# Patient Record
Sex: MALE | Race: WHITE | NOT HISPANIC OR LATINO | Employment: PART TIME | URBAN - METROPOLITAN AREA
[De-identification: names, ages, dates, MRNs, and addresses within clinical notes are randomized per-mention and may not be internally consistent; named-entity substitution may affect disease eponyms.]

---

## 2017-10-20 ENCOUNTER — TELEPHONE (OUTPATIENT)
Dept: URGENT CARE | Facility: CLINIC | Age: 62
End: 2017-10-20

## 2017-10-20 NOTE — TELEPHONE ENCOUNTER
----- Message from Sergio Rosado MD sent at 10/19/2017  6:46 PM EDT -----  Urine culture was positive. Needs to finish the antibiotic plus needs to  a second antibiotic and take it for 5 days. If still having symptoms, needs to recheck with PMD.

## 2017-12-12 ENCOUNTER — TRANSCRIBE ORDERS (OUTPATIENT)
Dept: PHYSICAL THERAPY | Facility: HOSPITAL | Age: 62
End: 2017-12-12

## 2017-12-12 DIAGNOSIS — I63.9 STROKE OF UNKNOWN CAUSE (HCC): Primary | ICD-10-CM

## 2017-12-12 DIAGNOSIS — R53.1 WEAKNESS: ICD-10-CM

## 2017-12-13 ENCOUNTER — HOSPITAL ENCOUNTER (OUTPATIENT)
Dept: OCCUPATIONAL THERAPY | Facility: HOSPITAL | Age: 62
Setting detail: THERAPIES SERIES
Discharge: HOME | End: 2017-12-13

## 2017-12-13 DIAGNOSIS — Z78.9 IMPAIRED INSTRUMENTAL ACTIVITIES OF DAILY LIVING (IADL): ICD-10-CM

## 2017-12-13 DIAGNOSIS — M62.81 MUSCLE WEAKNESS: ICD-10-CM

## 2017-12-13 DIAGNOSIS — Z78.9 IMPAIRED MOBILITY AND ADLS: Primary | ICD-10-CM

## 2017-12-13 DIAGNOSIS — Z74.09 IMPAIRED MOBILITY AND ADLS: Primary | ICD-10-CM

## 2017-12-13 PROCEDURE — G8988 SELF CARE GOAL STATUS: HCPCS

## 2017-12-13 PROCEDURE — G8987 SELF CARE CURRENT STATUS: HCPCS

## 2017-12-13 PROCEDURE — 97166 OT EVAL MOD COMPLEX 45 MIN: CPT

## 2017-12-13 NOTE — THERAPY EVALUATION
Outpatient Occupational Therapy Rehab Program Initial Evaluation  [facility]     Patient Name: Bakari Dover  : 1955  MRN: [id number]  Today's Date: 2017      Visit Date: 2017    There is no problem list on file for this patient.  Visit Number: /   Improvement: N/A  Recert Date: 1/10/18  MD visit: 17         Past Medical History:   Diagnosis Date   • Diabetes mellitus    • Hypertension    • Kidney stone    • Renal disorder         Past Surgical History:   Procedure Laterality Date   • BACK SURGERY     • BRAIN SURGERY           Visit Dx:    ICD-10-CM ICD-9-CM   1. Impaired mobility and ADLs Z74.09 799.89   2. Impaired instrumental activities of daily living (IADL) R53.81 799.3   3. Muscle weakness M62.81 728.87             Patient History       17 0800          History    Chief Complaint Balance Problems;Difficulty with daily activities;Falls/history of falls;Fatigue/poor endurance;Impaired sensation;Muscle tenderness;Muscle weakness;Numbness;Pain;Tingling;Difficulty Walking  -MR      Type of Pain Back pain  -MR      Date Current Problem(s) Began 06/15/17  -MR      Brief Description of Current Complaint 06/15/17 pt admitted to hospital with compressed vertebra, he had surgery on vertebra resulting in subdural hematoma causing right side weakness, weakness has since improved. Pt engaged in OT at [facility] for ~ one month (October), pt moved into a [place] apartment, he lives alone, and within the past week has been released to drive. Pt had  therapy at home and was d/c from  on 17. Pt does have carpal tunnel syndrome and neuropathy in his hands both diagnosed last week. Pt has MD appoint,ent this date and educated to keep us updated. Pt has old left shoulder rotator cuff injury, resulting in mild to moderate pain during increased activity. Pt reported he had a recent fall on 17 when he tried to use his straight cane for community mobility.    -MR      Onset Date-  OT 12/13/17  -MR      Patient/Caregiver Goals Return to prior level of function;Improve strength;Improve mobility;Other (comment);Know what to do to help the symptoms   improve hand function, balance, increase endurance  -MR      Current Tobacco Use none  -MR      Smoking Status none  -MR      Patient's Rating of General Health Good  -MR      Hand Dominance right-handed  -MR      Occupation/sports/leisure activities enjoys reading, word with friends, design work, - pt not at PLOF with preferred leisure activities.   -MR      Patient seeing anyone else for problem(s)? No  -MR      Pain     Pain Location Back  -MR      Pain at Present 2  -MR      Pain at Best 0  -MR      Pain at Worst 9  -MR      Pain Frequency Constant/continuous  -MR      Pain Description Sharp  -MR      What Performance Factors Make the Current Problem(s) WORSE? when first getting up and when first standing in the morning  -MR      What Performance Factors Make the Current Problem(s) BETTER? rest, sleep, laying down  -MR      Pain Comments does not take medication for pain  -MR      Is your sleep disturbed? No  -MR      Is medication used to assist with sleep? Yes   restless leg meds  -MR      Total hours of sleep per night 7 - 8  -MR      What position do you sleep in? Right sidelying;Left sidelying  -MR      Difficulties at work? 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 Currently not working. Pt also previously assisted with an .   -MR      Difficulties with ADL's? Pt faitgues quickly during/after bathing and dressing even with the use of AD. Pt fatigues quickly when preparing light meals he implements energy conservation techniques by sitting on his rollator. Pt performs light house keeping with increased time to complete task and increased fatigue with rest breaks, pt performs sweeping, spot mopping, and laundry with mild difficulty d/t decreased endurance. Pt did not identify any difficulty  cutting food up or self-feeding.   -MR      Difficulties with recreational activities? Pt identifies that he enjoys reading, playing games on electronics, pt stated he would like to regain function in his hands to be able to fix things around the house without having to ask for assistance.   -MR      Fall Risk Assessment    Any falls in the past year: Yes  -MR      Number of falls reported in the last 12 months 3  -MR      Factors that contributed to the fall: Tripped;Uneven surface;Lost balance  -MR      Does patient have a fear of falling No  -MR      Services    Prior Rehab/Home Health Experiences Yes  -MR      When was the prior experience with Rehab/Home Health HH  -MR      Are you currently receiving Home Health services No  -MR      Daily Activities    Primary Language English  -MR      How does patient learn best? Demonstration;Pictures/Video  -MR      Teaching needs identified Home Exercise Program;Management of Condition;Falls Prevention;Home Safety  -MR      Patient is concerned about/has problems with Coordination;Difficulty with self care (i.e. bathing, dressing, toileting:;Flexibility;Grasping objects lifting;Performing home management (household chores, shopping, care of dependents);Performing job responsibilities/community activities (work, school,;Performing sports, recreation, and play activities;Repetitive movements of the hand, arm, shoulder;Standing;Writing/grasping items with hand(s);Transfers (getting out of a chair, bed)  -MR      Barriers to learning None  -MR      Pt Participated in POC and Goals Yes  -MR      Safety    Are you being hurt, hit, or frightened by anyone at home or in your life? No  -MR      Are you being neglected by a caregiver No  -MR        User Key  (r) = Recorded By, (t) = Taken By, (c) = Cosigned By    Initials Name Provider Type     Michelle JOSÉ Santana, OT Occupational Therapist                  OT Neuro       12/13/17 0800          Subjective Comments    Subjective  Comments Pt reported he has an MD appointment this pm. Pt reports he was d/c from  on 12/4/17 and has recently been released to drive. Pt expresses his major concerns are improving his balance, strength, endurance, and hand function.  -MR      Precautions and Contraindications    Precautions/Limitations fall precautions  -MR      Subjective Pain    Able to rate subjective pain? yes  -MR      Pre-Treatment Pain Level 2  -MR      Post-Treatment Pain Level 3  -MR      Subjective Pain Comment Pt explained he has lower back pain he associates with sciatic nerve pain, expresses pain is worste when first getting up in the morning and when standing after sitting for lengthly periods  -MR      Pain Assessment    Pain Assessment --  -MR      Home Living    Living Arrangements apartment   ADA  -MR      Home Accessibility bed and bath on same level;grab bars present (bathtub);grab bars present (toilet)  -MR      Stair Railings at Home none  -MR      Home Equipment Rollator;Cane;Reacher;Sock aid;Grab bars;Rolling walker   shower bench, hand held shower head, toilet tongs  -MR      Living Environment Comment Pt lives in alone in an [place] apartment and pt implements various AD for completing ADL's  -MR      Vision- Basic    Current Vision Wears glasses only for reading  -MR      Cognitive Assessment/Intervention    Current Cognitive/Communication Assessment functional  -MR      Orientation Status oriented x 4  -MR      Follows Commands/Answers Questions 100% of the time;able to follow multi-step instructions;able to follow single-step instructions  -MR      Personal Safety mild impairment  -MR      Personal Safety Interventions nonskid shoes/slippers when out of bed;supervised activity  -MR      Cognition Comments Pt completed the SLUMS examination this date. Pt scored 22/30. Pt demstrated difficulty recaling 5 objects recalled 4/5, pt recalled 9 animals in 1 minute, and pt had difficulty following directsion regarding placing  hour markers on the clock face, pt recalled 3/4 answers appropriately from the short passage.   -MR      Sensation    Sensation WNL? WNL  -MR      Light Touch No apparent deficits  -MR      Proprioception    Proprioception WFL  -MR      Perception    Perception WNL? WNL  -MR      Coordination    Coordination WNL? WNL  -MR      Coordination Tests Finger to nose eyes open;9-Hole Peg  -MR      Finger to Nose Eyes Open Bilteral:;Intact  -MR      9-Hole Peg Left 34.22 s; no drops  -MR      9-Hole Peg Right 33.56 s; 1 drop  -MR      ROM (Range of Motion)    General ROM no range of motion deficits identified  -MR      General ROM Detail BUE WFL- some difficulties noted in shoulder/BUE range trying to touch the anterior part of head.   -MR      MMT (Manual Muscle Testing)    General MMT Assessment upper extremity strength deficits identified  -MR      General MMT Assessment Detail BUE grossly 4/5  -MR      Transfers    Transfers, Sit-Stand [place] conditional independence  -MR      Transfers, Stand-Sit [place] conditional independence  -MR      Transfers, Sit-Stand-Sit, Assist Device --   rollator  -MR      Functional Mobility    Functional Mobility- Ind. Level conditional independence;supervision required  -MR      Functional Mobility- Device rollator  -MR      Functional Mobility-Distance (Feet) --   from lobby <> OT office  -MR      Functional Mobility- Safety Issues step length decreased;other (see comments)  -MR      Functional Mobility- Comment Pt did not display safety with the rollator, brakes where not locked when he completed transfers. Took pt increased time to transition from sitting to standing  -MR      Balance Skills Training    Sitting-Level of Assistance Independent  -MR      Sitting-Balance Support Right upper extremity supported;Left upper extremity supported;Feet supported   back supported  -MR      Standing-Level of Assistance Distant supervision;Independent  -MR      Static Standing Balance  Support Right upper extremity supported;Left upper extremity supported;assistive device   rollator  -MR      Gait Balance-Level of Assistance Distant supervision;Independent  -MR      Gait Balance Support Right upper extremity supported;Left upper extremity supported;assistive device   rollator  -MR        User Key  (r) = Recorded By, (t) = Taken By, (c) = Cosigned By    Initials Name Provider Type    MR Michelle Santana, OT Occupational Therapist           Hand Therapy (last 24 hours)      Hand Eval       12/13/17 0800          Subjective Comments    Subjective Comments Please see OT neuro section  -MR      Hand  Strength     Strength Affected Side Bilateral  -MR       Strength Right    Right  Test 1 60  -MR      Right  Test 2 61  -MR      Right  Test 3 55  -MR       Strength Average Right 58.67  -MR       Strength Left    Left  Test 1 67  -MR      Left  Test 2 56  -MR      Left  Test 3 69  -MR       Strength Average Left 64  -MR      Pinch Strength    Affected Side Bilateral  -MR      Right Hand Strength - Pinch (lbs)    Lateral 17 lbs  -MR      Tip (2 point) 8 lbs  -MR      Three Jaw Atul 14 lbs  -MR      Left Hand Strength - Pinch (lbs)    Lateral 18 lbs  -MR      Tip (2 point) 11 lbs  -MR      Three Jaw Atul 14 lbs  -MR      Therapy Education    Education Details Pt educated about role of OT and POC. Pt educated on updated OT regarding MD visits or any changes with carpal tunnel syndrome. Pt educated on the importance of carry over from OP to home; including safety recommendations and HEP. Pt encouraged to continue with HEP; theraband exercises and BUE AROM exercises. Pt educated on the benefit of activity.   -MR      Given HEP;Symptoms/condition management   OT POC  -MR      Program New  -MR      How Provided Verbal  -MR      Provided to Patient  -MR      Level of Understanding Verbalized  -MR        User Key  (r) = Recorded By, (t) = Taken By, (c) = Cosigned  By    Initials Name Provider Type     Michelle KUMAR Max, OT Occupational Therapist                Therapy Education  Education Details: Pt educated about role of OT and POC. Pt educated on updated OT regarding MD visits or any changes with carpal tunnel syndrome. Pt educated on the importance of carry over from OP to home; including safety recommendations and HEP. Pt encouraged to continue with HEP; theraband exercises and BUE AROM exercises. Pt educated on the benefit of activity.   Given: HEP, Symptoms/condition management (OT POC)  Program: New  How Provided: Verbal  Provided to: Patient  Level of Understanding: Verbalized          OT Goals       12/13/17 1047 12/13/17 0800    OT Short Term Goals    STG Date to Achieve  --   by 4 weeks  -MR    STG 1  Pt will complete FM activities with 75% accuracy on at least 2/3 sessions.   -MR    STG 1 Progress  New  -MR    STG 2  Pt will complete BUE AROM exercises in all planes to increase strength to 5/5 to increase functional independence with ADL's and IADL's.  -MR    STG 2 Progress  New  -MR    STG 3  Pt will tolerate 5-7 mins of functional standing activity with no lose of balance or increased pain on 2/3 sessions.   -MR    STG 3 Progress  New  -MR    STG 4  Pt will complete BUE coordination task with 75% accuracy on at least 2/3 sessions.   -MR    STG 4 Progress  New  -MR    Long Term Goals    LTG Date to Achieve  --   by d/c  -MR    LTG 1  Pt will be independent with progressing HEP on at least 3/3 sessions.  -MR    LTG 1 Progress  New  -MR    LTG 2  Pt will be able to simulate and/or report conditional independence with efficient time frame with all ADL's on at least 2/3 sessions.   -MR    LTG 2 Progress  New  -MR    LTG 3  Pt will be able to simulate and/or report conditional independence with efficient time frame with IADL skills including light house keeping tasks, meal preparation, and general home management skills on at least 2/3 sessions.  -MR    LTG 3  Progress  New  -MR    LTG 4  Pt will tolerate 45 minutes treatment session with no fatigue/rest breaks required on at least 2/3 session.   -MR    LTG 4 Progress  New  -MR    Time Calculation    OT Goal Re-Cert Due Date 01/10/18  -MR 01/10/18  -MR      User Key  (r) = Recorded By, (t) = Taken By, (c) = Cosigned By    Initials Name Provider Type    MR Michelle Santana, OT Occupational Therapist                OT Assessment/Plan       12/13/17 0800       OT Assessment    Functional Limitations Decreased safety during functional activities;Impaired locomotion;Limitation in home management;Limitations in community activities;Limitations in functional capacity and performance;Performance in leisure activities;Performance in self-care ADL;Performance in work activities  -MR     Impairments Balance;Cognition;Coordination;Dexterity;Endurance;Impaired flexibility;Impaired muscle endurance;Impaired muscle length;Impaired muscle power;Impaired postural alignment;Impaired sensory integrity;Joint integrity;Joint mobility;Locomotion;Motor function;Muscle strength;Pain;Poor body mechanics;Posture;Sensation;Impaired aerobic capacity;Impaired neuromotor development  -MR     Assessment Comments OT eval completed this date. Pt completed sit <> stand t/f with rollator with sup/cond I, pt demonstrated safety concerns with t/f's and functional ambulation. Pt completed quick DASH, 9-hole peg test, SLUMS, ROM/MMT/Sensation testing this date. Pt identified balance and hand function where two areas of major concern, pt also identified that his endurance and activity tolerance are decreased resulting in difficulty completing ADL's and [facility]ADL's at [facility]. Pt would benefit from skilled OT services d/t decreased strength, decreased endurance, decreased balance, decreased activity tolerance, decreased independence with ADL's, decreased FM/GM/ bilateral integration and coordination decreased functional independence with IADL's.  -MR     OT Diagnosis  impaired mobility, ADL's and IADL's  -MR     OT Rehab Potential Good  -MR     Patient/caregiver participated in establishment of treatment plan and goals Yes  -MR     Patient would benefit from skilled therapy intervention Yes  -MR     OT Plan    OT Frequency 2x/week  -MR     Predicted Duration of Therapy Intervention (days/wks) Pt will be re-assessed in 30 days  -MR     Planned CPT's? OT EVAL MOD COMPLEXITY: 22823;OT THER ACT EA 15 MIN: [id number];OT THER PROC EA 15 MIN: 11884UI;OT SELF CARE/MGMT/TRAIN 15 MIN: 36362;OT HOT/COLD PACK;OT CARE PLAN EA 15 MIN;OT THER SUPP EA 15 MIN:;OT RE-EVAL: 67733;OT NEUROMUSC RE EDUCATION EA 15 MIN: 68164;OT PARAFFIN BATH: 39060UO;OT MANUAL THERAPY EA 15 MIN: 49613;OT SENS INTEGRATIVE TECH EACH 15 MIN: 43260   superficial modalities and k-tape as needed  -MR     Planned Therapy Interventions (Optional Details) balance training;home exercise program;joint mobilization;manual therapy techniques;motor coordination training;patient/family education;postural re-education;ROM (Range of Motion);stretching;strengthening;transfer training;neuromuscular re-education   superficial modalities  -MR     OT Plan Comments Recommend skilled OT services to help pt reach maximum level of independence with ADL's and IADL's.   -MR       User Key  (r) = Recorded By, (t) = Taken By, (c) = Cosigned By    Initials Name Provider Type     Michelle Santana, OT Occupational Therapist                  Outcome Measure Options: Quick DASH  9 Hole Peg  9-Hole Peg Left: 34.22 s; no drops  9-Hole Peg Right: 33.56 s; 1 drop  Quick DASH  Open a tight or new jar.: Mild Difficulty  Do heavy household chores (e.g., wash walls, wash floors): Moderate Difficulty  Carry a shopping bag or briefcase: Mild Difficulty  Wash your back: Severe Difficulty  Use a knife to cut food: Moderate Difficulty  Recreational activities in which you take some force or impact through your arm, should or hand (e.g. golf, hammering, tennis,  etc.): Severe Difficulty  During the past week, to what extent has your arm, shoulder, or hand problem interfered with your normal social activites with family, friends, neighbors or groups?: Moderately  During the past week, were you limited in your work or other regular daily activities as a result of your arm, shoulder or hand problem?: Moderately Limited  Arm, Shoulder, or hand pain: Moderate  Tingling (pins and needles) in your arm, shoulder, or hand: Severe  During the past week, how much difficulty have you had sleeping because of the pain in your arm, shoulder or hand?: Mild Difficulty  Number of Questions Answered: 11  Quick DASH Score: 50         Time Calculation:   OT Start Time: 0800  OT Stop Time: 0905  OT Time Calculation (min): 65 min     Therapy Charges for Today     Code Description Service Date Service Provider Modifiers Qty    [id number]  OT SELFCARE CURRENT 12/13/2017 SHAHIDA Fontenot, CK 1    [id number]  OT SELFCARE PROJECTED 12/13/2017 SHAHIDA Fontenot, CI 1    [id number]  OT EVAL MOD COMPLEXITY 4 12/13/2017 Michelle Santana OT GO 1          OT G-codes  OT Professional Judgement Used?: Yes  OT Functional Scales Options: 9 Hole Peg, Quick DASH  Score: 50  Functional Limitation: Self care  Self Care Current Status (): At least 40 percent but less than 60 percent impaired, limited or restricted  Self Care Goal Status (): At least 1 percent but less than 20 percent impaired, limited or restricted       Michelle Santana OT  12/13/2017

## 2017-12-18 ENCOUNTER — HOSPITAL ENCOUNTER (OUTPATIENT)
Dept: OCCUPATIONAL THERAPY | Facility: HOSPITAL | Age: 62
Setting detail: THERAPIES SERIES
Discharge: HOME | End: 2017-12-18

## 2017-12-18 DIAGNOSIS — Z78.9 IMPAIRED INSTRUMENTAL ACTIVITIES OF DAILY LIVING (IADL): ICD-10-CM

## 2017-12-18 DIAGNOSIS — Z78.9 IMPAIRED MOBILITY AND ADLS: Primary | ICD-10-CM

## 2017-12-18 DIAGNOSIS — M62.81 MUSCLE WEAKNESS: ICD-10-CM

## 2017-12-18 DIAGNOSIS — Z74.09 IMPAIRED MOBILITY AND ADLS: Primary | ICD-10-CM

## 2017-12-18 PROCEDURE — 97530 THERAPEUTIC ACTIVITIES: CPT

## 2017-12-18 PROCEDURE — 97110 THERAPEUTIC EXERCISES: CPT

## 2017-12-18 NOTE — THERAPY TREATMENT NOTE
Outpatient Occupational Therapy Rehab Program Treatment  [facility]     Patient Name: Bakari Dover  : 1955  MRN: [id number]  Today's Date: 2017        Visit Date: 2017    There is no problem list on file for this patient.  Visit Number: 2/2  % Improvement: N/A  Recert Date: 01/10/18  MD visit: N/A    Insurance Visits Approved: 22 total, 20 remaining       Past Medical History:   Diagnosis Date   • Diabetes mellitus    • Hypertension    • Kidney stone    • Renal disorder         Past Surgical History:   Procedure Laterality Date   • BACK SURGERY     • BRAIN SURGERY           Visit Dx:    ICD-10-CM ICD-9-CM   1. Impaired mobility and ADLs Z74.09 799.89   2. Impaired instrumental activities of daily living (IADL) R53.81 799.3   3. Muscle weakness M62.81 728.87               OT Neuro       17 0930          Subjective Comments    Subjective Comments Pt reported he was able to engage in community activities over the weekend with no lose of balance or safety concerns noted. Pt reported he has been experiencing sciatic nerve pain following increased ambulation and activity.   -MR      Precautions and Contraindications    Precautions/Limitations fall precautions  -MR      Cognitive Assessment/Intervention    Current Cognitive/Communication Assessment functional  -MR      Orientation Status oriented x 4  -MR      Follows Commands/Answers Questions 100% of the time;able to follow multi-step instructions  -MR      Personal Safety mild impairment   rollator safety concerns  -MR      Personal Safety Interventions fall prevention program maintained;gait belt;nonskid shoes/slippers when out of bed;supervised activity  -MR      Coordination    Coordination WNL? WNL  -MR      Gross Motor Training    Gross Motor Skill, Impairments Detail Pt completed functional cooking task this date consisting of: using a manual can opener, measuring, pouring, mixing ingredients while standing unsupport working at  a table top level. Pt stood for ~ 9 minutes with one seated recovery. Pt demonstrated 50-60% FM accuracy during in-hand manipulation task. Pt engaged BUE bilateral integration activity. Pt completed BUE exercises on biodex while standing with rollator behind him, he completed 2 x 15 reps of shoulder flexion/extension and chest pull with 40# wt. Pt required one seated recovery between sets.     -MR      Transfers    Transfers, Sit-Stand [place] conditional independence  -MR      Transfers, Stand-Sit [place] conditional independence  -MR      Functional Mobility    Functional Mobility- Ind. Level conditional independence  -MR      Functional Mobility- Device rollator  -MR      Functional Mobility- Comment Pt displayed safety concerns with rollator use.   -MR      Balance Skills Training    Sitting-Level of Assistance Independent  -MR      Sitting-Balance Support Right upper extremity supported;Left upper extremity supported;Feet supported  -MR      Standing-Level of Assistance Distant supervision;Independent  -MR      Static Standing Balance Support Right upper extremity supported;Left upper extremity supported;assistive device  -MR      Gait Balance-Level of Assistance Distant supervision;Independent  -MR      Gait Balance Support Right upper extremity supported;Left upper extremity supported;assistive device  -MR        User Key  (r) = Recorded By, (t) = Taken By, (c) = Cosigned By    Initials Name Provider Type    MR Michelle Santana, OT Occupational Therapist           Hand Therapy (last 24 hours)      Hand Eval       12/18/17 9657          Subjective Comments    Subjective Comments Please see OT neuro session  -MR      Subjective Pain    Able to rate subjective pain? yes  -MR      Pre-Treatment Pain Level 0  -MR      Post-Treatment Pain Level 0  -MR      Pain Assessment    Pain Assessment No/denies pain  -MR      Therapy Education    Education Details Pt educated on safety with rollator use (locking  brakes when setting and proprer placement during functional standing balance tasks. Pt educated on HEP consisting of stretches. Pt educated on the continued benefit of activity and increasing his partcipation in IADL's.    -MR      Given HEP  -MR      Program Progressed  -MR      How Provided Verbal;Demonstration  -MR      Provided to Patient  -MR      Level of Understanding Verbalized  -MR        User Key  (r) = Recorded By, (t) = Taken By, (c) = Cosigned By    Initials Name Provider Type    MR Michelle Santana, OT Occupational Therapist                Therapy Education  Education Details: Pt educated on safety with rollator use (locking brakes when setting and proprer placement during functional standing balance tasks. Pt educated on HEP consisting of stretches. Pt educated on the continued benefit of activity and increasing his partcipation in IADL's.    Given: HEP  Program: Progressed  How Provided: Verbal, Demonstration  Provided to: Patient  Level of Understanding: Verbalized          OT Assessment/Plan       12/18/17 0930       OT Assessment    Assessment Comments Pt did not met any new goals this date. Pt participated in functional cooking task requiring increased endurance and activity tolerance, GMC and FMC skills. Pt tolerated ~ 9 minutes of standing during meal prep activity. Pt required cues to take rest breaks appropriately. Pt demonstrated decreased safety awareness for rollator use. Pt completed BUE AROM exercises in shoulder flexion/extension and elbow flexion/extension. Pt would continue to benefit from skilled OT services d/t decreased strength, decreased endurance, decreased activity tolerance, decreased GMC/FMC/B coordination and B integration. Recommend cont POC.  -MR     OT Plan    OT Frequency 2x/week  -MR       User Key  (r) = Recorded By, (t) = Taken By, (c) = Cosigned By    Initials Name Provider Type    MR Michelle Santana, OT Occupational Therapist                 OT Goals        12/18/17 0930       OT Short Term Goals    STG Date to Achieve --   by 4 weeks  -MR     STG 1 Pt will complete FM activities with 75% accuracy on at least 2/3 sessions.   -MR     STG 1 Progress Progressing  -MR     STG 2 Pt will complete BUE AROM exercises in all planes to increase strength to 5/5 to increase functional independence with ADL's and IADL's.  -MR     STG 2 Progress Progressing  -MR     STG 3 Pt will tolerate 5-7 mins of functional standing activity with no lose of balance or increased pain on 2/3 sessions.   -MR     STG 3 Progress Progressing  -MR     STG 4 Pt will complete BUE coordination task with 75% accuracy on at least 2/3 sessions.   -MR     STG 4 Progress Progressing  -MR     Long Term Goals    LTG Date to Achieve --   by d/c  -MR     LTG 1 Pt will be independent with progressing HEP on at least 3/3 sessions.  -MR     LTG 1 Progress Progressing  -MR     LTG 2 Pt will be able to simulate and/or report conditional independence with efficient time frame with all ADL's on at least 2/3 sessions.   -MR     LTG 2 Progress Ongoing  -MR     LTG 3 Pt will be able to simulate and/or report conditional independence with efficient time frame with IADL skills including light house keeping tasks, meal preparation, and general home management skills on at least 2/3 sessions.  -MR     LTG 3 Progress Progressing  -MR     LTG 4 Pt will tolerate 45 minutes treatment session with no fatigue/rest breaks required on at least 2/3 session.   -MR     LTG 4 Progress Progressing  -MR     Time Calculation    OT Goal Re-Cert Due Date 01/10/18  -MR       User Key  (r) = Recorded By, (t) = Taken By, (c) = Cosigned By    Initials Name Provider Type    MR Michelle Santana, OT Occupational Therapist                              Time Calculation:   OT Start Time: 0930  OT Stop Time: 1015  OT Time Calculation (min): 45 min  Total Timed Code Minutes- OT: 45 minute(s)     Therapy Charges for Today     Code Description Service Date  Service Provider Modifiers Qty    [id number]  OT THERAPEUTIC ACT EA 15 MIN 12/18/2017 Michelle Santana OT GO 2    [id number]  OT THER PROC EA 15 MIN 12/18/2017 Michelle Santana OT GO 1                    Michelle Santana OT  12/18/2017

## 2017-12-20 ENCOUNTER — HOSPITAL ENCOUNTER (OUTPATIENT)
Dept: OCCUPATIONAL THERAPY | Facility: HOSPITAL | Age: 62
Setting detail: THERAPIES SERIES
Discharge: HOME | End: 2017-12-20

## 2017-12-20 DIAGNOSIS — Z78.9 IMPAIRED INSTRUMENTAL ACTIVITIES OF DAILY LIVING (IADL): ICD-10-CM

## 2017-12-20 DIAGNOSIS — M62.81 MUSCLE WEAKNESS: ICD-10-CM

## 2017-12-20 DIAGNOSIS — Z74.09 IMPAIRED MOBILITY AND ADLS: Primary | ICD-10-CM

## 2017-12-20 DIAGNOSIS — Z78.9 IMPAIRED MOBILITY AND ADLS: Primary | ICD-10-CM

## 2017-12-20 PROCEDURE — 97530 THERAPEUTIC ACTIVITIES: CPT

## 2017-12-20 PROCEDURE — 97110 THERAPEUTIC EXERCISES: CPT

## 2017-12-20 NOTE — THERAPY TREATMENT NOTE
Outpatient Occupational Therapy Rehab Program Treatment  [facility]     Patient Name: Bakari Dover  : 1955  MRN: [id number]  Today's Date: 2017        Visit Date: 2017    There is no problem list on file for this patient.  Visit Number: 3/3  % Improvement: N/A  Recert Date: 01/10/18  MD visit: next month    Insurance Visits Approved: 22 total 19 remaining       Past Medical History:   Diagnosis Date   • Diabetes mellitus    • Hypertension    • Kidney stone    • Renal disorder         Past Surgical History:   Procedure Laterality Date   • BACK SURGERY     • BRAIN SURGERY           Visit Dx:    ICD-10-CM ICD-9-CM   1. Impaired mobility and ADLs Z74.09 799.89   2. Impaired instrumental activities of daily living (IADL) R53.81 799.3   3. Muscle weakness M62.81 728.87               OT Neuro       17 0944          Subjective Comments    Subjective Comments Pt reported he felt increased stiffness this date. Pt reported he does not have an MD appointment for another month.   -MR      Precautions and Contraindications    Precautions/Limitations fall precautions  -MR      Subjective Pain    Able to rate subjective pain? yes  -MR      Pre-Treatment Pain Level 0  -MR      Post-Treatment Pain Level 0  -MR      Subjective Pain Comment Pt did not report any pain this date. Pt reported he had increased stiffness this date.   -MR      Pain Assessment    Pain Assessment No/denies pain  -MR      Cognitive Assessment/Intervention    Current Cognitive/Communication Assessment functional  -MR      Orientation Status oriented x 4  -MR      Follows Commands/Answers Questions 100% of the time;able to follow single-step instructions;needs cueing  -MR      Personal Safety mild impairment   safety with rollator use  -MR      Personal Safety Interventions fall prevention program maintained;gait belt;nonskid shoes/slippers when out of bed;supervised activity  -MR      Posture/Observations    Posture- WNL  "Posture is WNL  -MR      Coordination    Other Coordination Observations During B integration/coordination activities he displayed mild - moderate difficulty, displaying a delayed response.   -MR      Coordination Tests Rapid Alternating;Bilateral integration  -MR      Rapid Alternating Right:;Left:;Impaired  -MR      Bilateral Integration Right:;Left:;Impaired   mild  -MR      Gross Motor Training    Gross Motor Skill, Impairments Detail Pt completed BUE exercises on [employer] machine, 2 x 15 reps with 20#, pt completed L/R one arm shoulder ab/ad push and pull exercise while standing with min VC for technique and safety. Pt demonstrated fair - balance when initating exercises. Pt completed 2 x 15 ball tosses on rebounder while setaed on rollator with 2# weighted ball to focus on B integration and coordination skills; pt had 3 drops during task. Attempted to completed rebounder activity in standing pt became unsteady, activity was discontinued. Pt completed functional standing balance activities while standing with no AD, pt stood for 4 x 2 minutes each to completed alternating dribbling task x 2 and chest passes x 2. Chest passed challenged pts balance by causing pt to reach outside of MARC. Pt required multiple seated recovery periods this date during BUE exercises and between each standing balance activity. Pt educated on HEP stretches and simiulated stretches while laying in mat table; pt completed shoulder flexion/extension and chest presses while laying supine with none weighted straight cane 1 x 10 reps. Pt required vc for technique and appropriate speed of exercises \" not to rush, slow and controlled.\"   -MR      ROM (Range of Motion)    General ROM Detail Pt educated on completing ROM HEP stretches with non-weight dowel each morning, pt simulated/demonstrated stretches while laying supine on mat table - shoulder flexion/extension and chest presses.   -MR      Bed Mobility    Bed Mobility, Scoot/Bridge, " [place] conditional independence  -MR      Bed Mob, Supine to Sit, [place] conditional independence  -MR      Bed Mob, Sit to Supine, [place] conditional independence  -MR      Bed Mobility, Comment Bed mobility required extra time  -MR      Transfers    Transfers, Sit-Stand [place] supervision required;stand by assist;verbal cues required  -MR      Transfers, Stand-Sit [place] supervision required;stand by assist;verbal cues required  -MR      Transfers, Sit-Stand-Sit, Assist Device --   rollator  -MR      Transfer, Comment Pt displayed poor safety with rollator use. Pt required mod VC to remember to lock brakes during t/f.   -MR      Functional Mobility    Functional Mobility- Ind. Level conditional independence;supervision required  -MR      Functional Mobility- Device rollator  -MR      Functional Mobility-Distance (Feet) --   throughout sports [employer] building  -MR      Functional Mobility- Safety Issues step length decreased;loses balance backward   rollator use safety  -MR      Functional Mobility- Comment Pt displayed poor safety with rollator use. Pt required mod VC to remember to lock brakes during t/f.   -MR      Balance Skills Training    Sitting-Level of Assistance Independent  -MR      Sitting-Balance Support Feet supported  -MR      Standing-Level of Assistance Close supervision;Distant supervision;Independent;Contact guard   depending upon difficulty of stabding balance activity  -MR      Static Standing Balance Support Right upper extremity supported;Left upper extremity supported;assistive device  -MR      Gait Balance-Level of Assistance Distant supervision;Close supervision  -MR      Gait Balance Support Right upper extremity supported;Left upper extremity supported;assistive device  -MR        User Key  (r) = Recorded By, (t) = Taken By, (c) = Cosigned By    Initials Name Provider Type    MR Michelle Santana, OT Occupational Therapist           Hand Therapy (last 24 hours)       Hand Eval       12/20/17 0944          Therapy Education    Education Details Pt educated on HEP; continuing current exercises and implementing new AROM stretches with dowel lashaun while laying supine. Pt educated on safety with t/f using rollator safely, remembering to lock the brakes.   -MR      Given HEP  -MR      Program Progressed  -MR      How Provided Verbal;Demonstration  -MR      Provided to Patient  -MR      Level of Understanding Demonstrated;Verbalized  -MR        User Key  (r) = Recorded By, (t) = Taken By, (c) = Cosigned By    Initials Name Provider Type    MR Michelle Santana, OT Occupational Therapist                Therapy Education  Education Details: Pt educated on HEP; continuing current exercises and implementing new AROM stretches with dowel l[place] while laying supine. Pt educated on safety with t/f using rollator safely, remembering to lock the brakes.   Given: HEP  Program: Progressed  How Provided: Verbal, Demonstration  Provided to: Patient  Level of Understanding: Demonstrated, Verbalized          OT Assessment/Plan       12/20/17 0944       OT Assessment    Assessment Comments Pt did not met any new goals this session. Pt completed functional standing balance with increased awareness of balance deficits. Pt completed B integration tasks with 60-70% accuracy while sitting. Pt completed BUE strengthing in all planes with min difficulty. Pt showed signs of increased fatigue and perspiration. Pt completed HEP AROM stretches with min VC cues and encouraged to complete AROM stretches each morning. Pt would continue to beneift from skilled OT services.   -MR     OT Plan    OT Frequency 2x/week  -MR       User Key  (r) = Recorded By, (t) = Taken By, (c) = Cosigned By    Initials Name Provider Type    MR Michelle Santana, OT Occupational Therapist                 OT Goals       12/20/17 0944       OT Short Term Goals    STG Date to Achieve --   by 4 weeks  -MR     STG 1 Pt will complete FM  activities with 75% accuracy on at least 2/3 sessions.   -MR     STG 1 Progress Progressing  -MR     STG 2 Pt will complete BUE AROM exercises in all planes to increase strength to 5/5 to increase functional independence with ADL's and IADL's.  -MR     STG 2 Progress Progressing  -MR     STG 3 Pt will tolerate 5-7 mins of functional standing activity with no lose of balance or increased pain on 2/3 sessions.   -MR     STG 3 Progress Progressing  -MR     STG 4 Pt will complete BUE coordination task with 75% accuracy on at least 2/3 sessions.   -MR     STG 4 Progress Progressing  -MR     Long Term Goals    LTG Date to Achieve --   by d/c  -MR     LTG 1 Pt will be independent with progressing HEP on at least 3/3 sessions.  -MR     LTG 1 Progress Progressing  -MR     LTG 2 Pt will be able to simulate and/or report conditional independence with efficient time frame with all ADL's on at least 2/3 sessions.   -MR     LTG 2 Progress Ongoing  -MR     LTG 3 Pt will be able to simulate and/or report conditional independence with efficient time frame with IADL skills including light house keeping tasks, meal preparation, and general home management skills on at least 2/3 sessions.  -MR     LTG 3 Progress Progressing  -MR     LTG 4 Pt will tolerate 45 minutes treatment session with no fatigue/rest breaks required on at least 2/3 session.   -MR     LTG 4 Progress Progressing  -MR     Time Calculation    OT Goal Re-Cert Due Date 01/10/18  -MR       User Key  (r) = Recorded By, (t) = Taken By, (c) = Cosigned By    Initials Name Provider Type    MR Michelle Santana OT Occupational Therapist                              Time Calculation:   OT Start Time: 0944  OT Stop Time: 1030  OT Time Calculation (min): 46 min  Total Timed Code Minutes- OT: 46 minute(s)     Therapy Charges for Today     Code Description Service Date Service Provider Modifiers Qty    [id number]  OT THERAPEUTIC ACT EA 15 MIN 12/20/2017 Michelle Santana OT GO  1    [id number]  OT THER PROC EA 15 MIN 12/20/2017 Michelle Santana OT GO 2                    Michelle Santana OT  12/20/2017

## 2017-12-27 ENCOUNTER — HOSPITAL ENCOUNTER (OUTPATIENT)
Dept: OCCUPATIONAL THERAPY | Facility: HOSPITAL | Age: 62
Setting detail: THERAPIES SERIES
Discharge: HOME | End: 2017-12-27

## 2017-12-27 DIAGNOSIS — Z78.9 IMPAIRED MOBILITY AND ADLS: Primary | ICD-10-CM

## 2017-12-27 DIAGNOSIS — Z74.09 IMPAIRED MOBILITY AND ADLS: Primary | ICD-10-CM

## 2017-12-27 DIAGNOSIS — Z78.9 IMPAIRED INSTRUMENTAL ACTIVITIES OF DAILY LIVING (IADL): ICD-10-CM

## 2017-12-27 DIAGNOSIS — M62.81 MUSCLE WEAKNESS: ICD-10-CM

## 2017-12-27 PROCEDURE — 97530 THERAPEUTIC ACTIVITIES: CPT

## 2017-12-27 PROCEDURE — 97110 THERAPEUTIC EXERCISES: CPT

## 2017-12-27 NOTE — THERAPY TREATMENT NOTE
Outpatient Occupational Therapy Rehab Program Treatment  [facility]     Patient Name: Bakari Dover  : 1955  MRN: [id number]  Today's Date: 2017        Visit Date: 2017    There is no problem list on file for this patient.  Visit Number: 4/4  % Improvement: N/A  Recert Date: 01/10/18  MD visit: 18    Insurance Visits Approved: 22 total 18 remaining        Past Medical History:   Diagnosis Date   • Diabetes mellitus    • Hypertension    • Kidney stone    • Renal disorder         Past Surgical History:   Procedure Laterality Date   • BACK SURGERY     • BRAIN SURGERY           Visit Dx:    ICD-10-CM ICD-9-CM   1. Impaired mobility and ADLs Z74.09 799.89   2. Impaired instrumental activities of daily living (IADL) R53.81 799.3   3. Muscle weakness M62.81 728.87               OT Neuro       17 0930          Subjective Comments    Subjective Comments Pt reported he has a neurologist appt on 18. Pt reported he did not have any increased stiffness this date. Pt reported he drove to [place] to visit family for the holidays with no difficulty, pt reported he was fatigued from all of the festivities.  -MR      Precautions and Contraindications    Precautions/Limitations fall precautions  -MR      Subjective Pain    Able to rate subjective pain? yes  -MR      Pre-Treatment Pain Level 0  -MR      Post-Treatment Pain Level 0  -MR      Subjective Pain Comment Pt did not report any pain this date. Following BUE exercises he reported fatigue but no discomfort.   -MR      Pain Assessment    Pain Assessment No/denies pain  -MR      Cognitive Assessment/Intervention    Current Cognitive/Communication Assessment functional  -MR      Orientation Status oriented x 4  -MR      Follows Commands/Answers Questions 100% of the time;able to follow multi-step instructions;needs increased time  -MR      Personal Safety mild impairment   improved from previous treatment session  -MR      Personal  Safety Interventions fall prevention program maintained;gait belt;muscle strengthening facilitated;nonskid shoes/slippers when out of bed;supervised activity  -MR      Posture/Observations    Posture- WNL Posture is WNL  -MR      Gross Motor Training    Gross Motor Skill, Impairments Detail Pt completed 10 minutes continuous activity at level 4 on the arm bike with rest break at conclusion of task. Pt completed body blade task while seated with back unsupported on mat table 2 x 30 sec in horizonal and vertical planes with BUE. Pt demonstrated fair coordination with task, increased difficulty with left UE compared to right. Pt required vc for technique and appropriate rest breaks between sets. Pt completed 2 x 5 sets of finger flexion/extension exercises on power web to improve overall hand function, pt had minimum difficulty with the task.   -MR      ROM (Range of Motion)    General ROM Detail Recommended to cont to complete AROM shoulder flexion in am.   -MR      Transfers    Transfers, Sit-Stand [place] conditional independence  -MR      Transfers, Stand-Sit [place] conditional independence  -MR      Transfers, Sit-Stand-Sit, Assist Device other (see comments)   rollator  -MR      Transfer, Comment Pt displayed improved safety awareness with rollator use this date.   -MR      Functional Mobility    Functional Mobility- Ind. Level conditional independence  -MR      Functional Mobility- Device rollator  -MR      ADL Assessment/Intervention    IADL Assess/Train, Comment Pt completed functional dynamic standing balance task. folding laundry and hanging clothes up. Pt stood for 2 x 5 minutes with 1 LOB, pt required a seated recovery period d/t LOB. Pt displayed decreased endurance during task and required increased time to complete laundry task.   -MR      Additional Documentation IADL Assess/Train, Comment (Row)  -MR      Balance Skills Training    Sitting-Level of Assistance Independent  -MR       Sitting-Balance Support Feet supported  -MR      Standing-Level of Assistance Contact guard  -MR      Static Standing Balance Support No upper extremity supported  -MR      Standing-Balance Activities --   laundry task  -MR      Gait Balance-Level of Assistance Independent  -MR      Gait Balance Support Right upper extremity supported;Left upper extremity supported;assistive device  -MR        User Key  (r) = Recorded By, (t) = Taken By, (c) = Cosigned By    Initials Name Provider Type    MR Michelle Santana, OT Occupational Therapist           Hand Therapy (last 24 hours)      Hand Eval       12/27/17 0930          Therapy Education    Education Details Pt educated on the importance of completing HEP and the benfits of consistancy. Pt educated on energy conservation and safety with rollator.   -MR      Given HEP  -MR      Program Reinforced  -MR      How Provided Verbal  -MR      Provided to Patient  -MR      Level of Understanding Verbalized  -MR        User Key  (r) = Recorded By, (t) = Taken By, (c) = Cosigned By    Initials Name Provider Type    MR Michelle Santana, OT Occupational Therapist                Therapy Education  Education Details: Pt educated on the importance of completing HEP and the benfits of consistancy. Pt educated on energy conservation and safety with rollator.   Given: HEP  Program: Reinforced  How Provided: Verbal  Provided to: Patient  Level of Understanding: Verbalized          OT Assessment/Plan       12/27/17 0930       OT Assessment    Assessment Comments Pt did not met any new OT goals this date. Pt completed BUE strengthening and endurance training activity for 10 continuous mins followed by a rest break. Pt completed isometric exercises focused on coordination and strengthening, pt displayed min difficulty with task. Pt competed functional standing balance IADL task with 1 LOB, standing for 2 x 5 minutes. Pt would continue to benefit from skilled OT to address decreased  strength, endurance, balance, safety awareness, GMC, FMC, and independence with ADL's and IADL's. Recommend cont POC.   -MR     OT Plan    OT Frequency 2x/week  -MR       User Key  (r) = Recorded By, (t) = Taken By, (c) = Cosigned By    Initials Name Provider Type    MR Michelle Santana, OT Occupational Therapist                 OT Goals       12/27/17 0930       OT Short Term Goals    STG Date to Achieve --   by 4 weeks  -MR     STG 1 Pt will complete FM activities with 75% accuracy on at least 2/3 sessions.   -MR     STG 1 Progress Progressing  -MR     STG 2 Pt will complete BUE AROM exercises in all planes to increase strength to 5/5 to increase functional independence with ADL's and IADL's.  -MR     STG 2 Progress Progressing  -MR     STG 3 Pt will tolerate 5-7 mins of functional standing activity with no lose of balance or increased pain on 2/3 sessions.   -MR     STG 3 Progress Progressing  -MR     STG 3 Progress Comments Pt stood for 2 x 5 minutes this date with 1 lose of balance  -MR     STG 4 Pt will complete BUE coordination task with 75% accuracy on at least 2/3 sessions.   -MR     STG 4 Progress Progressing  -MR     Long Term Goals    LTG Date to Achieve --   by d/c  -MR     LTG 1 Pt will be independent with progressing HEP on at least 3/3 sessions.  -MR     LTG 1 Progress Progressing  -MR     LTG 2 Pt will be able to simulate and/or report conditional independence with efficient time frame with all ADL's on at least 2/3 sessions.   -MR     LTG 2 Progress Ongoing;Progressing  -MR     LTG 3 Pt will be able to simulate and/or report conditional independence with efficient time frame with IADL skills including light house keeping tasks, meal preparation, and general home management skills on at least 2/3 sessions.  -MR     LTG 3 Progress Progressing  -MR     LTG 4 Pt will tolerate 45 minutes treatment session with no fatigue/rest breaks required on at least 2/3 session.   -MR     LTG 4 Progress Progressing   -MR     Time Calculation    OT Goal Re-Cert Due Date 01/10/18  -MR       User Key  (r) = Recorded By, (t) = Taken By, (c) = Cosigned By    Initials Name Provider Type     Michelle Santana OT Occupational Therapist                              Time Calculation:   OT Start Time: 0930  OT Stop Time: 1015  OT Time Calculation (min): 45 min  Total Timed Code Minutes- OT: 45 minute(s)     Therapy Charges for Today     Code Description Service Date Service Provider Modifiers Qty    04793363661  OT THERAPEUTIC ACT EA 15 MIN 12/27/2017 Michelle Santana OT GO 1    [id number]  OT THER PROC EA 15 MIN 12/27/2017 Michelle Santana OT GO 2                    Michelle Santana OT  12/27/2017

## 2017-12-28 ENCOUNTER — HOSPITAL ENCOUNTER (OUTPATIENT)
Dept: PHYSICAL THERAPY | Facility: HOSPITAL | Age: 62
Setting detail: THERAPIES SERIES
Discharge: HOME | End: 2017-12-28

## 2017-12-28 ENCOUNTER — HOSPITAL ENCOUNTER (OUTPATIENT)
Dept: OCCUPATIONAL THERAPY | Facility: HOSPITAL | Age: 62
Setting detail: THERAPIES SERIES
Discharge: HOME | End: 2017-12-28

## 2017-12-28 DIAGNOSIS — Z74.09 IMPAIRED MOBILITY AND ADLS: Primary | ICD-10-CM

## 2017-12-28 DIAGNOSIS — Z78.9 IMPAIRED MOBILITY AND ADLS: Primary | ICD-10-CM

## 2017-12-28 DIAGNOSIS — M62.81 MUSCLE WEAKNESS: ICD-10-CM

## 2017-12-28 DIAGNOSIS — I63.9 STROKE OF UNKNOWN CAUSE (HCC): Primary | ICD-10-CM

## 2017-12-28 DIAGNOSIS — Z78.9 IMPAIRED INSTRUMENTAL ACTIVITIES OF DAILY LIVING (IADL): ICD-10-CM

## 2017-12-28 DIAGNOSIS — R53.1 WEAKNESS: ICD-10-CM

## 2017-12-28 PROCEDURE — 97110 THERAPEUTIC EXERCISES: CPT | Performed by: PHYSICAL THERAPIST

## 2017-12-28 PROCEDURE — 97162 PT EVAL MOD COMPLEX 30 MIN: CPT | Performed by: PHYSICAL THERAPIST

## 2017-12-28 PROCEDURE — 97530 THERAPEUTIC ACTIVITIES: CPT

## 2017-12-28 PROCEDURE — 97110 THERAPEUTIC EXERCISES: CPT

## 2017-12-28 NOTE — THERAPY TREATMENT NOTE
Outpatient Occupational Therapy Rehab Program Treatment  [facility]     Patient Name: Bakari Dover  : 1955  MRN: [id number]  Today's Date: 2017        Visit Date: 2017    There is no problem list on file for this patient.  Visit Number: 5/5  % Improvement: 10%  Recert Date: 01/10/18  MD visit: 18    Insurance Visits Approved: 22 total 17 remaining       Past Medical History:   Diagnosis Date   • Diabetes mellitus    • Hypertension    • Kidney stone    • Renal disorder         Past Surgical History:   Procedure Laterality Date   • BACK SURGERY     • BRAIN SURGERY           Visit Dx:    ICD-10-CM ICD-9-CM   1. Impaired mobility and ADLs Z74.09 799.89   2. Impaired instrumental activities of daily living (IADL) R53.81 799.3   3. Muscle weakness M62.81 728.87               OT Neuro       17 1015          Subjective Comments    Subjective Comments Pt did not report any stiffness this date. Pt has upcoming md appoiuntment with neurologist on 18. Pt reported he feels he is improving reporting a 10% overall improvement since beginning outpatient therapy.   -MR      Precautions and Contraindications    Precautions/Limitations fall precautions  -MR      Subjective Pain    Able to rate subjective pain? yes  -MR      Pre-Treatment Pain Level 0  -MR      Post-Treatment Pain Level 0  -MR      Cognitive Assessment/Intervention    Current Cognitive/Communication Assessment functional  -MR      Orientation Status oriented x 4  -MR      Follows Commands/Answers Questions 100% of the time;able to follow multi-step instructions  -MR      Personal Safety mild impairment  -MR      Personal Safety Interventions fall prevention program maintained;gait belt;muscle strengthening facilitated;nonskid shoes/slippers when out of bed;supervised activity  -MR      Proprioception    Proprioception WFL  -MR      Posture/Observations    Posture- WNL Posture is WNL  -MR      Coordination    Other  Coordination Observations Pt displayed improved bilateration integration and appropriate response time/reaction time. Pt displayed mild difficulty with rapid alternating activity.   -MR      Coordination Tests Rapid Alternating;Bilateral integration  -MR      Rapid Alternating Right:;Left:;Impaired   mild  -MR      Bilateral Integration Right:;Left:;Intact   improved from previous session  -MR      Gross Motor Training    Gross Motor Skill, Impairments Detail Pt completed BUE AROM exercises in all planes with 3# dowel lashaun 1 x 20 reps, with rest breaks throughout exercises. Pt completed weighted ball core strengthening exercises with 2# medicine ball. Pt completed ball tosses with 2# medicine ball and completed passes over head with BUE 2 x 20 reps. Pt completed BUE exercises while seated on the mat table with back unsupported. Pt completed 5 sit <> stand t/f with CGA. Pt completed functional standing balance task with no AD with CGA, task required pt to complete B integration/coorination task consisitng of bouncing a large ball and lifting over head 2 x 20 times standing for 2 x 2 minutes. Pt did not have any LOB. Pt completed dribbling task while standing with no AD with CGA 2 minutes with each hand. Pt completed weight shifting while standing with CGA, pt shifted left/right/front/back for ~ 5 minutes with 1 LOB he was able to recover without assistance. Pt completed functional FMC task while standing with LUE supoorted on countertop, pt completed task with 70% accuracy and required min VC for appropriate technique.   -MR      Transfers    Transfers, Sit-Stand [place] conditional independence  -MR      Transfers, Stand-Sit [place] conditional independence  -MR      Transfers, Sit-Stand-Sit, Assist Device --   rollator  -MR      Transfer, Comment Pt displayed apprporiate safety awareness with rollator with only one instance of forgeting to lock brakes a major improvement.   -MR      Functional Mobility     Functional Mobility- Ind. Level conditional independence  -MR      Functional Mobility- Device rollator  -MR      Balance Skills Training    Sitting-Level of Assistance Independent  -MR      Sitting-Balance Support Feet supported  -MR      Standing-Level of Assistance Contact guard  -MR      Static Standing Balance Support No upper extremity supported  -MR      Standing-Balance Activities --   standing balance activities  -MR      Gait Balance-Level of Assistance Independent  -MR      Gait Balance Support Right upper extremity supported;Left upper extremity supported;assistive device  -MR        User Key  (r) = Recorded By, (t) = Taken By, (c) = Cosigned By    Initials Name Provider Type    MR Michelle Santana OT Occupational Therapist           Hand Therapy (last 24 hours)      Hand Eval       12/28/17 1015          Subjective Pain    Able to rate subjective pain? yes  -MR      Pre-Treatment Pain Level 0  -MR      Post-Treatment Pain Level 0  -MR      Subjective Pain Comment --   Some stiffness in neck  -MR      Pain Assessment    Pain Assessment No/denies pain  -MR      Therapy Education    Education Details Pt educated on the importance and benefit of carry over with HEP, pt reported he has been completing various exercises each morning. Pt educated on the role of OT and POC. Pt educated on safety with rollator and safety within home when completing ADL's/IADL's.   -MR      Given HEP  -MR      Program Progressed  -MR      How Provided Verbal;Demonstration  -MR      Provided to Patient  -MR      Level of Understanding Verbalized;Demonstrated  -MR        User Key  (r) = Recorded By, (t) = Taken By, (c) = Cosigned By    Initials Name Provider Type    MR Michelle SantanaSHAHIDA Occupational Therapist                Therapy Education  Education Details: Pt educated on the importance and benefit of carry over with HEP, pt reported he has been completing various exercises each morning. Pt educated on the role of OT and  POC. Pt educated on safety with rollator and safety within home when completing ADL's/IADL's.   Given: HEP  Program: Progressed  How Provided: Verbal, Demonstration  Provided to: Patient  Level of Understanding: Verbalized, Demonstrated          OT Assessment/Plan       12/28/17 1015       OT Assessment    Assessment Comments Pt did not met any new OT goals this session. Pt completed BUE AROM exercises with min fatigue and min rest breaks. Pt displayed increased safety awareness with rollator use. Pt reported he is completing HEP appropriately. Pt would continue to benefit from skilled OT to address decreased strength, endurance, balance, safety awareness, GMC, FMC, and independence with ADL's and IADL's. Recommend cont POC.  -MR     OT Plan    OT Frequency 2x/week  -MR       User Key  (r) = Recorded By, (t) = Taken By, (c) = Cosigned By    Initials Name Provider Type     Michelle Santana, OT Occupational Therapist                 OT Goals       12/28/17 1100 12/28/17 1015    OT Short Term Goals    STG Date to Achieve --  -MR --   by 4 weeks  -MR    STG 1 --  -MR Pt will complete FM activities with 75% accuracy on at least 2/3 sessions.   -MR    STG 1 Progress --  -MR Progressing  -MR    STG 2 --  -MR Pt will complete BUE AROM exercises in all planes to increase strength to 5/5 to increase functional independence with ADL's and IADL's.  -MR    STG 2 Progress --  -MR Progressing  -MR    STG 3 --  -MR Pt will tolerate 5-7 mins of functional standing activity with no lose of balance or increased pain on 2/3 sessions.   -MR    STG 3 Progress --  -MR Progressing  -MR    STG 4 --  -MR Pt will complete BUE coordination task with 75% accuracy on at least 2/3 sessions.   -MR    STG 4 Progress --  -MR Progressing;Partially Met  -MR    STG 4 Progress Comments --  -MR Pt completed BUE coordination/integration activity this date with 90% accuracy; 1 drop  -MR    Long Term Goals    LTG Date to Achieve --  -MR --   by d/c  -MR     LTG 1 --  -MR Pt will be independent with progressing HEP on at least 3/3 sessions.  -MR    LTG 1 Progress --  -MR Progressing  -MR    LTG 2 --  -MR Pt will be able to simulate and/or report conditional independence with efficient time frame with all ADL's on at least 2/3 sessions.   -MR    LTG 2 Progress --  -MR Ongoing;Progressing  -MR    LTG 3 --  -MR Pt will be able to simulate and/or report conditional independence with efficient time frame with IADL skills including light house keeping tasks, meal preparation, and general home management skills on at least 2/3 sessions.  -MR    LTG 3 Progress --  -MR Progressing  -MR    LTG 4 --  -MR Pt will tolerate 45 minutes treatment session with no fatigue/rest breaks required on at least 2/3 session.   -MR    LTG 4 Progress --  -MR Progressing  -MR    Time Calculation    OT Goal Re-Cert Due Date --  -MR 01/10/18  -MR      User Key  (r) = Recorded By, (t) = Taken By, (c) = Cosigned By    Initials Name Provider Type     Michelle Santana OT Occupational Therapist                              Time Calculation:   OT Start Time: 1015  OT Stop Time: 1100  OT Time Calculation (min): 45 min  Total Timed Code Minutes- OT: 45 minute(s)     Therapy Charges for Today     Code Description Service Date Service Provider Modifiers Qty    [id number]  OT THERAPEUTIC ACT EA 15 MIN 12/28/2017 Michelle Santana OT GO 2    [id number]  OT THER PROC EA 15 MIN 12/28/2017 Michelle Santana OT GO 1                    Michelle Santana OT  12/28/2017

## 2017-12-28 NOTE — THERAPY EVALUATION
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       Past Medical History:   Diagnosis Date   • Diabetes mellitus    • Hypertension    • Kidney stone    • Renal disorder         Past Surgical History:   Procedure Laterality Date   • BACK SURGERY     • BRAIN SURGERY         Visit Dx:     ICD-10-CM ICD-9-CM   1. Stroke of unknown cause I63.9 434.91   2. Weakness R53.1 780.79             Patient History       17 1100          History    Chief Complaint Balance Problems;Difficulty with daily activities;Falls/history of falls;Fatigue/poor endurance;Impaired sensation;Muscle tenderness;Muscle weakness;Numbness;Pain;Tingling;Difficulty Walking  -BB      Type of Pain Upper Extremity / Arm  -BB      Date Current Problem(s) Began 06/15/17  -BB      Brief Description of Current Complaint Reports  went in to have C3-4 spinal fusion due to a spur on his spine, performed the surgery that went well and had been doing rehab for 2-3 weeks and had a subdural hematoma and then had to have a surgery to release pressure and then was in hospital until 2nd week of October. Then had home health and once got permission to drive started outpatient. Reports living alone in a [place] apartment. Reports being able to care for self but takes increased time to perform tasks. Reports being in OT for UE and LE are weak. Reports had been starting a cane at home after home health and reports mistepping at home one day and fell to the ground since being home. Reports falling once at [place] once. Notes having RLS and spasms in legs. Has history of blood clot in RLE with a filter in the  leg.   -BB      Onset Date- OT 12/28/2017  -BB      Patient/Caregiver Goals Return to prior level of function;Improve mobility  -BB      Patient's Rating of General Health Good  -BB      Hand Dominance right-handed  -BB      Occupation/sports/leisure activities enjoys reading, word with friends, design work, - pt not at PLOF with preferred leisure activities.   -BB      Pain     Pain Location Neck  -BB      Pain at Present --   Stiffness in neck   -BB      Pain at Best 0  -BB      Pain at Worst 9  -BB      Pain Frequency Intermittent  -BB      Pain Description Spasm;Numbness;Tingling  -BB      What Performance Factors Make the Current Problem(s) WORSE? First get going   -BB      What Performance Factors Make the Current Problem(s) BETTER? rest  -BB      Tolerance Time- Standing 5 minutes   -BB      Tolerance Time- Walking 5 minutes   -BB      Is your sleep disturbed? No  -BB      Is medication used to assist with sleep? Yes  -BB      Fall Risk Assessment    Any falls in the past year: Yes  -BB      Number of falls reported in the last 12 months --   2-3   -BB        User Key  (r) = Recorded By, (t) = Taken By, (c) = Cosigned By    Initials Name Provider Type    BB Jacklyn Chamberlain PT Physical Therapist                PT Ortho       12/28/17 1100    Subjective Comments    Subjective Comments See patient history   -BB    Precautions and Contraindications    Precautions/Limitations fall precautions  -BB    Subjective Pain    Able to rate subjective pain? yes  -BB    Pre-Treatment Pain Level --   stiffness in neck   -BB    Post-Treatment Pain Level 0  -BB    Posture/Observations    Posture/Observations Comments No acute distress. Shuffling gait with rollator. Independent with transfers  -BB    Quarter Clearing    Quarter Clearing Lower Quarter Clearing  -BB    ROM (Range of Motion)    General ROM Detail BLE ROM is WNL   -BB    MMT (Manual Muscle Testing)    General MMT Assessment Detail BLE grossly 4/5 hip and knee 4+/5  ankle  -BB    Balance Skills Training    SLS unsafe  -BB    Rhomberg unable to perform complete test position- sway all planes with worse in anterior/posterior plane  -BB    Balance Comments CTSIB composite score: 1.64 with min assist with dynamic eyes closed testing condition and all other SB  -BB    Transfers    Transfer, Comment Independent with UE assist   -BB    Gait Assessment/Treatment    Gait, Comment Gait with rollator with shuffling gait   -BB      User Key  (r) = Recorded By, (t) = Taken By, (c) = Cosigned By    Initials Name Provider Type    BB Jacklyn Chamberlain, PT Physical Therapist           Hand Therapy (last 24 hours)      Hand Eval       12/28/17 1015          Subjective Pain    Able to rate subjective pain? yes  -MR      Pre-Treatment Pain Level 0  -MR      Post-Treatment Pain Level 0  -MR      Subjective Pain Comment --   Some stiffness in neck  -MR      Pain Assessment    Pain Assessment No/denies pain  -MR      Therapy Education    Education Details Pt educated on the importance and benefit of carry over with HEP, pt reported he has been completing various exercises each morning. Pt educated on the role of OT and POC. Pt educated on safety with rollator and safety within home when completing ADL's/IADL's.   -MR      Given HEP  -MR      Program Progressed  -MR      How Provided Verbal;Demonstration  -MR      Provided to Patient  -MR      Level of Understanding Verbalized;Demonstrated  -MR        User Key  (r) = Recorded By, (t) = Taken By, (c) = Cosigned By    Initials Name Provider Type     Michelle JOSÉ Santana, OT Occupational Therapist                    Therapy Education  Education Details: Hip add, LAQ, TR/HR  Given: HEP  Program: New  How Provided: Verbal (pictures )  Provided to: Patient  Level of Understanding: Verbalized, Demonstrated           PT OP Goals       12/28/17 1100       PT Short Term Goals    STG Date to Achieve 01/18/18  -BB     STG 1 Independent in HEP   -BB     STG 2 Static  stand narrow MARC 2' with minimal sway  -BB     STG 3 Knee and ankle MMT 5/5  -BB     STG 4 Hip MMT 5/5   -BB     STG 5 Ambulate community distances with cane safely   -BB     Time Calculation    PT Goal Re-Cert Due Date 01/18/18  -BB       User Key  (r) = Recorded By, (t) = Taken By, (c) = Cosigned By    Initials Name Provider Type    BB Jacklyn Chamberlain, PT Physical Therapist                PT Assessment/Plan       12/28/17 1100       PT Assessment    Functional Limitations Impaired gait;Impaired locomotion;Limitations in community activities;Limitations in functional capacity and performance;Decreased safety during functional activities  -BB     Impairments Balance;Gait;Endurance;Impaired muscle endurance;Coordination;Muscle strength;Pain;Posture;Poor body mechanics;Sensation  -BB     Assessment Comments Patient presents today with BLE weakness, balance deficits, decreased endurance, and decreased motor planning of BLE and could benefit from skilled PT services addressing deficits.   -BB     Please refer to paper survey for additional self-reported information No  -BB     Rehab Potential Good  -BB     Patient/caregiver participated in establishment of treatment plan and goals Yes  -BB     Patient would benefit from skilled therapy intervention Yes  -BB     PT Plan    PT Frequency 2x/week  -BB     Predicted Duration of Therapy Intervention (days/wks) 4 weeks  -BB     Planned CPT's? PT EVAL MOD COMPLELITY: 39169;PT RE-EVAL: 76004;PT THER PROC EA 15 MIN: 33308;PT MANUAL THERAPY EA 15 MIN: 69579;PT GAIT TRAINING EA 15 MIN: 80625;PT THER SUPP EA 15 MIN  -BB     Physical Therapy Interventions (Optional Details) balance training;gait training;gross motor skills;home exercise program;manual therapy techniques;motor coordination training;patient/family education;postural re-education;ROM (Range of Motion);strengthening;stretching;transfer training  -BB     PT Plan Comments Progress BLE strength, balance and gait as able.  Allow breaks as needed and build endurance as tolerated   -BB       User Key  (r) = Recorded By, (t) = Taken By, (c) = Cosigned By    Initials Name Provider Type    XIOMARA Chamberlain PT Physical Therapist                  Exercises       12/28/17 1100 12/28/17 1015       Subjective Comments    Subjective Comments See patient history   -BB      Subjective Pain    Able to rate subjective pain? yes  -BB yes  -MR     Pre-Treatment Pain Level --   stiffness in neck   -BB 0  -MR     Post-Treatment Pain Level 0  -BB 0  -MR     Subjective Pain Comment  --   Some stiffness in neck  -MR     Exercise 1    Exercise Name 1 Sitting HR/TR  -BB      Sets 1 1  -BB      Reps 1 10  -BB      Exercise 2    Exercise Name 2 hip add with pillow   -BB      Sets 2 1  -BB      Reps 2 10  -BB      Exercise 3    Exercise Name 3 LAQ   -BB      Sets 3 1  -BB      Reps 3 10  -BB      Additional Comments bilateral  -BB      Exercise 4    Exercise Name 4 Step overs in parallel bars   -BB      Sets 4 1  -BB      Reps 4 10  -BB      Additional Comments bilateral   -BB        User Key  (r) = Recorded By, (t) = Taken By, (c) = Cosigned By    Initials Name Provider Type    XIOMARA Chamberlain, PT Physical Therapist    MR Michelle Santana, OT Occupational Therapist                                  Time Calculation:   Start Time: 1100  Stop Time: 1145  Time Calculation (min): 45 min  Total Timed Code Minutes- PT: 12 minute(s)     Therapy Charges for Today     Code Description Service Date Service Provider Modifiers Qty    43133167529 HC PT EVAL MOD COMPLEXITY 2 12/28/2017 Jacklyn Chamberlain, PT GP 1    78001533456 HC PT THER PROC EA 15 MIN 12/28/2017 Jacklyn Chamberlain PT GP 1                    Jacklyn Chamberlain, PT  12/28/2017

## 2018-01-03 ENCOUNTER — HOSPITAL ENCOUNTER (OUTPATIENT)
Dept: OCCUPATIONAL THERAPY | Facility: HOSPITAL | Age: 63
Setting detail: THERAPIES SERIES
Discharge: HOME | End: 2018-01-03

## 2018-01-03 ENCOUNTER — HOSPITAL ENCOUNTER (OUTPATIENT)
Dept: PHYSICAL THERAPY | Facility: HOSPITAL | Age: 63
Setting detail: THERAPIES SERIES
Discharge: HOME | End: 2018-01-03

## 2018-01-03 DIAGNOSIS — M62.81 MUSCLE WEAKNESS: ICD-10-CM

## 2018-01-03 DIAGNOSIS — Z78.9 IMPAIRED MOBILITY AND ADLS: Primary | ICD-10-CM

## 2018-01-03 DIAGNOSIS — I63.9 STROKE OF UNKNOWN CAUSE (HCC): Primary | ICD-10-CM

## 2018-01-03 DIAGNOSIS — Z74.09 IMPAIRED MOBILITY AND ADLS: Primary | ICD-10-CM

## 2018-01-03 DIAGNOSIS — Z78.9 IMPAIRED INSTRUMENTAL ACTIVITIES OF DAILY LIVING (IADL): ICD-10-CM

## 2018-01-03 DIAGNOSIS — R53.1 WEAKNESS: ICD-10-CM

## 2018-01-03 PROCEDURE — 97530 THERAPEUTIC ACTIVITIES: CPT

## 2018-01-03 PROCEDURE — 97110 THERAPEUTIC EXERCISES: CPT

## 2018-01-03 NOTE — THERAPY TREATMENT NOTE
Outpatient Occupational Therapy Rehab Program Treatment  [facility]     Patient Name: Bakari Dover  : 1955  MRN: [id number]  Today's Date: 1/3/2018        Visit Date: 2018    There is no problem list on file for this patient.  Visit Number: 66 Recert Date: 1/10/2018 % Improvement: 20%        MD Visit Date: 2018    Total Insurance visit approved:  total/ 16 remaining           Past Medical History:   Diagnosis Date   • Diabetes mellitus    • Hypertension    • Kidney stone    • Renal disorder         Past Surgical History:   Procedure Laterality Date   • BACK SURGERY     • BRAIN SURGERY           Visit Dx:    ICD-10-CM ICD-9-CM   1. Impaired mobility and ADLs Z74.09 799.89   2. Impaired instrumental activities of daily living (IADL) R53.81 799.3   3. Muscle weakness M62.81 728.87               OT Neuro       18 0930          Subjective Comments    Subjective Comments Pt reports MD appointment on 18 with Neurologist. Pt states he is compliant with HEP and has no pain, minor fatigue reported due to PT session this date. Pt was given new HEP this date and was able to demo independence. Pt reports stiffness in B hands this date.  -BL      Precautions and Contraindications    Precautions/Limitations fall precautions  -BL      Subjective Pain    Able to rate subjective pain? yes  -BL      Pre-Treatment Pain Level 0  -BL      Post-Treatment Pain Level 0  -BL      Pain Assessment    Pain Assessment No/denies pain  -BL      Cognitive Assessment/Intervention    Current Cognitive/Communication Assessment functional  -BL      Orientation Status oriented x 4  -BL      Follows Commands/Answers Questions 100% of the time  -BL      Personal Safety mild impairment   improving overall  -BL      Personal Safety Interventions gait belt;fall prevention program maintained;muscle strengthening facilitated;nonskid shoes/slippers when out of bed  -BL      Sensation    Light Touch Partial deficits in  the RUE;Partial deficits in the LUE  -BL      Sharp/Dull Partial deficits in the RUE;Partial deficits in the LUE  -BL      Additional Comments Pt reports inability to feel light touch in B hands due to peripheral neuropathy and carpal tunnel syndrome diagnosed by Neurologist and nerve conduction testing completed. Pt was given and educated on median nerve glides to assist in relieving some of the current symptoms however may need continued education on adaptive strategies for kitchen and home safety due to decreased sensation in B hands.   -BL      Posture/Observations    Alignment Options --   forward posture in standing  -BL      Posture/Observations Comments Pt demonstrated forward posture in standing requiring increased time and vc's for fully upright posture to be corrected. Pt was able to maintain upright position during dynamic standing task this date x 5 minutes with 1 rest break reporting moderate fatigue however good posture demonstrated throughout. Pt was educated on importance of stretches and use of dowel at home in supine along with pulleys for shoulder stretches and may benefit from education on doorway stretches for pec stretching as well.  -BL      Coordination    Other Coordination Observations Pt was able to complete all BUE GMC task this date with improved speed and accuracy requiring only minimal rest breaks due to fatigue noted. Pt required moderate vc's for rest breaks as pt was not identifying when he was in need of RB. Pt completed body blade GMC task in sitting in vertical planes with times as follows R arm 1 min 2 seconds; L arm 1 min 5 seconds. Horizontal Plane R arm 1 min 19 seconds; L arm 1 minute and 40 seconds. Pt demonstrated fair posture during GMC task with vc's for redirection of posture in standing. Pt completed standing BUE strenghtening/endurance task with vc's for posture and safety with rollator however completed 10 reps x 2 sets with 4 # weighted ball in dynamic standing  with no rest breaks and no LOB; mod fatigue noted at end of task.  -BL      Coordination Tests Bilateral integration;Praxis/Motor planning;Crossing midline  -BL      Crossing Midline Right:;Left:;Intact   with difficulty but intact  -BL      Bilateral Integration Right:;Left:;Intact   improving   -BL      Praxis/Motor Planning Right:;Left:;Intact  -BL      Gross Motor Training    Gross Motor Skill, Impairments Detail See above Coordination observation comment section for [facility] task information.  -BL      ROM (Range of Motion)    General ROM Detail Pt reports L shoulder old Rotator cuff injury with self healing however did not affect ROM; strength mainly affected.  -BL        User Key  (r) = Recorded By, (t) = Taken By, (c) = Cosigned By    Initials Name Provider Type    SHERIDAN Canales Occupational Therapy Assistant                  Therapy Education  Education Details: Update HEP this date to add tendon glides for median nerve due to carpal tunnel along with six pack exercises. Pt was able to demonstrate exercises given this date with no questions or concerns.   Given: HEP  Program: Progressed  How Provided: Verbal, Demonstration, Written  Provided to: Patient  Level of Understanding: Verbalized, Demonstrated, Teach back education performed          OT Assessment/Plan       01/03/18 0930 01/03/18 0852    OT Assessment    Assessment Comments Pt participated well this date with minimal rest breaks demonstrating increaed overall endurance. Pt was able to complete dynamic standing task with one rest break and no LOB reporting minimal fatigue. UE endurance task completed with body blade this date demonstrating fair core strength and good BUE coordination with increased speed and accuracy. Pt was able to meet HEP goal this date with reporting good compliance 4/7 x a week and STG #4 this date with increased accuracy with BUE's. Pt will continue to benefit from skilled OT to increase functional endurance and safety  for increasing overall independence with ADL/IADL's.  -BL     OT Plan    OT Frequency 2x/week  -BL     Predicted Duration of Therapy Intervention (days/wks)  4 weeks  -CARLOS      User Key  (r) = Recorded By, (t) = Taken By, (c) = Cosigned By    Initials Name Provider Type    CARLOS Thomas PTA Physical Therapy Assistant     SHERIDAN Galicia Occupational Therapy Assistant                 OT Goals       01/03/18 0930       OT Short Term Goals    STG Date to Achieve --   by 4 weeks  -BL     STG 1 Pt will complete FM activities with 75% accuracy on at least 2/3 sessions.   -BL     STG 1 Progress Progressing  -BL     STG 2 Pt will complete BUE AROM exercises in all planes to increase strength to 5/5 to increase functional independence with ADL's and IADL's.  -BL     STG 2 Progress Progressing  -BL     STG 3 Pt will tolerate 5-7 mins of functional standing activity with no lose of balance or increased pain on 2/3 sessions.   -BL     STG 3 Progress Partially Met  -BL     STG 3 Progress Comments met standing dynamic and static this date for 5 minutes with no rest breaks until end of 5 minutes  -BL     STG 4 Pt will complete BUE coordination task with 75% accuracy on at least 2/3 sessions.   -BL     STG 4 Progress Partially Met;Progressing  -BL     Long Term Goals    LTG Date to Achieve --   by d/c  -BL     LTG 1 Pt will be independent with progressing HEP on at least 3/3 sessions.  -BL     LTG 1 Progress Partially Met  -BL     LTG 1 Progress Comments Pt reports compliance with HEP so far given; updated HEP this date  -     LTG 2 Pt will be able to simulate and/or report conditional independence with efficient time frame with all ADL's on at least 2/3 sessions.   -BL     LTG 2 Progress Progressing  -BL     LTG 3 Pt will be able to simulate and/or report conditional independence with efficient time frame with IADL skills including light house keeping tasks, meal preparation, and general home management skills on at  least 2/3 sessions.  -BL     LTG 3 Progress Progressing  -BL     LTG 4 Pt will tolerate 45 minutes treatment session with no fatigue/rest breaks required on at least 2/3 session.   -BL     LTG 4 Progress Progressing  -BL     Time Calculation    OT Goal Re-Cert Due Date 01/10/18  -BL       User Key  (r) = Recorded By, (t) = Taken By, (c) = Cosigned By    Initials Name Provider Type     Anna Malloy GUEVARA/L Occupational Therapy Assistant                    OT Exercises       01/03/18 0930          Exercise 1    Exercise Name 1 Body blade  -BL      Cueing 1 Demo;Tactile;Verbal  -BL      Time (Minutes) 1 5  -BL      Intensity 1 Intense  -BL      Exercise 2    Exercise Name 2 Biodex  -BL      Cueing 2 Verbal;Demo  -BL      Equipment 2 Pulley  -BL      Weights/Plates 2 4  -BL      Sets 2 2  -BL      Reps 2 10  -BL      Intensity 2 Moderate  -BL      Exercise 3    Exercise Name 3 4# dumbell endurance task in standing  -BL      Cueing 3 Demo;Verbal  -BL      Equipment 3 Dumbell  -BL      Weights/Plates 3 4  -BL      Sets 3 2  -BL      Reps 3 10  -BL      Intensity 3 Moderate  -BL      Exercise 4    Exercise Name 4 Dynamic standing task  -BL      Cueing 4 Verbal;Demo  -BL      Equipment 4 --   weighted ball  -BL      Weights/Plates 4 4  -BL      Sets 4 1  -BL      Reps 4 10  -BL      Intensity 4 Moderate  -BL        User Key  (r) = Recorded By, (t) = Taken By, (c) = Cosigned By    Initials Name Provider Type     Anna Malloy GUEVARA/L Occupational Therapy Assistant                      Time Calculation:   OT Start Time: 0930  OT Stop Time: 1017  OT Time Calculation (min): 47 min  Total Timed Code Minutes- OT: 47 minute(s)     Therapy Charges for Today     Code Description Service Date Service Provider Modifiers Qty    33386678379 HC OT THER PROC EA 15 MIN 1/3/2018 Anna Malloy GUEVARA/L GO 2    24270261071 HC OT THERAPEUTIC ACT EA 15 MIN 1/3/2018 Anna Malloy GUEVARA/L GO 1                    Anna Malloy  GUEVARA/L  1/3/2018

## 2018-01-03 NOTE — THERAPY TREATMENT NOTE
Outpatient Physical Therapy Ortho Treatment Note  [facility]     Patient Name: Bakari Dover  : 1955  MRN: [id number]  Today's Date: 1/3/2018      Visit Date: 2018    Subjective Improvement: 0%  Attendance:  2/2 (30/yr)  Next MD Visit : TBD  Recert Date:  18      Therapy Diagnosis:  CVA with BLE weakness and balance deficits      Visit Dx:    ICD-10-CM ICD-9-CM   1. Stroke of unknown cause I63.9 434.91   2. Weakness R53.1 780.79       There is no problem list on file for this patient.       Past Medical History:   Diagnosis Date   • Diabetes mellitus    • Hypertension    • Kidney stone    • Renal disorder         Past Surgical History:   Procedure Laterality Date   • BACK SURGERY     • BRAIN SURGERY               PT Ortho       18 08    Precautions and Contraindications    Precautions/Limitations fall precautions  -CARLOS    Posture/Observations    Posture/Observations Comments gait with rollator this date.   -CARLOS      User Key  (r) = Recorded By, (t) = Taken By, (c) = Cosigned By    Initials Name Provider Type    CARLOS Thomas PTA Physical Therapy Assistant                            PT Assessment/Plan       18 0852       PT Assessment    Assessment Comments good progress with treatment this date, Minimal sway with balance activities; no change in HEP this date.  Continues to fatigue quickly.   -     PT Plan    PT Frequency 2x/week  -CARLOS     Predicted Duration of Therapy Intervention (days/wks) 4 weeks  -CARLOS     PT Plan Comments Continue to progress LE strength and balance as able.   -CARLOS       User Key  (r) = Recorded By, (t) = Taken By, (c) = Cosigned By    Initials Name Provider Type    CARLOS Thomas PTA Physical Therapy Assistant                    Exercises       18 0852          Subjective Comments    Subjective Comments Doing pretty well today.  States that his legs feel a little wobbly but they always do first thing in the morning.   -CARLOS      Subjective Pain  "   Able to rate subjective pain? yes  -KH      Pre-Treatment Pain Level 0  -KH      Post-Treatment Pain Level 0  -KH      Exercise 1    Exercise Name 1 pro ll LE strength  -KH      Time (Minutes) 1 10'  -KH      Additional Comments level 2 seat 11  -KH      Exercise 2    Exercise Name 2 seated CR/TR  -KH      Sets 2 3  -KH      Reps 2 10  -KH      Exercise 3    Exercise Name 3 LAQ w/ add   -KH      Sets 3 2  -KH      Reps 3 10  -KH      Exercise 4    Exercise Name 4 SLR standing 3 way  -KH      Sets 4 2  -KH      Reps 4 10  -KH      Exercise 5    Exercise Name 5 Balance: FA EO/EC  -KH      Sets 5 3  -KH      Time (Seconds) 5 30\" CGA  -KH      Exercise 6    Exercise Name 6 Balance: FT EO/EC  -KH      Sets 6 2  -KH      Time (Seconds) 6 30\"  -KH        User Key  (r) = Recorded By, (t) = Taken By, (c) = Cosigned By    Initials Name Provider Type    CARLOS Thomas PTA Physical Therapy Assistant                               PT OP Goals       01/03/18 0852       PT Short Term Goals    STG Date to Achieve 01/18/18  -KH     STG 1 Independent in HEP   -KH     STG 1 Progress Ongoing  -KH     STG 2 Static stand narrow MARC 2' with minimal sway  -KH     STG 2 Progress Progressing  -KH     STG 3 Knee and ankle MMT 5/5  -KH     STG 3 Progress Progressing  -KH     STG 4 Hip MMT 5/5   -KH     STG 4 Progress Progressing  -KH     STG 5 Ambulate community distances with cane safely   -KH     STG 5 Progress Progressing  -KH     Time Calculation    PT Goal Re-Cert Due Date 01/18/18  -       User Key  (r) = Recorded By, (t) = Taken By, (c) = Cosigned By    Initials Name Provider Type    CARLOS Thomas PTA Physical Therapy Assistant                         Time Calculation:   Start Time: 0852  Stop Time: 0930  Time Calculation (min): 38 min  Total Timed Code Minutes- PT: 38 minute(s)    Therapy Charges for Today     Code Description Service Date Service Provider Modifiers Qty    81975659286  PT THER PROC EA 15 MIN 1/3/2018 Rekha Thomas, " PTA GP 3                    Rekha Thomas, PTA  1/3/2018

## 2018-01-04 ENCOUNTER — HOSPITAL ENCOUNTER (OUTPATIENT)
Dept: PHYSICAL THERAPY | Facility: HOSPITAL | Age: 63
Setting detail: THERAPIES SERIES
Discharge: HOME | End: 2018-01-04

## 2018-01-04 ENCOUNTER — HOSPITAL ENCOUNTER (OUTPATIENT)
Dept: OCCUPATIONAL THERAPY | Facility: HOSPITAL | Age: 63
Setting detail: THERAPIES SERIES
Discharge: HOME | End: 2018-01-04

## 2018-01-04 DIAGNOSIS — Z74.09 IMPAIRED MOBILITY AND ADLS: Primary | ICD-10-CM

## 2018-01-04 DIAGNOSIS — R53.1 WEAKNESS: ICD-10-CM

## 2018-01-04 DIAGNOSIS — M62.81 MUSCLE WEAKNESS: ICD-10-CM

## 2018-01-04 DIAGNOSIS — Z78.9 IMPAIRED INSTRUMENTAL ACTIVITIES OF DAILY LIVING (IADL): ICD-10-CM

## 2018-01-04 DIAGNOSIS — I63.9 STROKE OF UNKNOWN CAUSE (HCC): Primary | ICD-10-CM

## 2018-01-04 DIAGNOSIS — Z78.9 IMPAIRED MOBILITY AND ADLS: Primary | ICD-10-CM

## 2018-01-04 PROCEDURE — 97110 THERAPEUTIC EXERCISES: CPT

## 2018-01-04 PROCEDURE — 97530 THERAPEUTIC ACTIVITIES: CPT

## 2018-01-04 NOTE — THERAPY TREATMENT NOTE
Outpatient Occupational Therapy Rehab Program Treatment  [facility]     Patient Name: Bakari Dover  : 1955  MRN: [id number]  Today's Date: 2018        Visit Date: 2018    There is no problem list on file for this patient.  Visit Number: 7 Recert Date: 1/10/2018 % Improvement: 20%         MD Visit Date: 2018    Total Insurance visit approved:  total /15 remaining.          Past Medical History:   Diagnosis Date   • Diabetes mellitus    • Hypertension    • Kidney stone    • Renal disorder         Past Surgical History:   Procedure Laterality Date   • BACK SURGERY     • BRAIN SURGERY           Visit Dx:    ICD-10-CM ICD-9-CM   1. Impaired mobility and ADLs Z74.09 799.89   2. Impaired instrumental activities of daily living (IADL) R53.81 799.3   3. Muscle weakness M62.81 728.87               OT Neuro       18 0930          Subjective Comments    Subjective Comments Pt here reporting he is compliant with all HEP and completed some this am before session. Pt reports no new questions or concerns regarding OT; stating main concerns at this time are balance and overall endurance to increase independence with ADL's and getting in/out of his vehicle.   -BL      Precautions and Contraindications    Precautions/Limitations fall precautions  -BL      Pain Assessment    Pain Assessment No/denies pain  -BL      Cognitive Assessment/Intervention    Current Cognitive/Communication Assessment functional  -BL      Orientation Status oriented x 4  -BL      Follows Commands/Answers Questions 100% of the time  -BL      Personal Safety mild impairment   improving with rollator  -BL      Personal Safety Interventions gait belt;fall prevention program maintained;muscle strengthening facilitated  -BL      Proprioception    Proprioception WFL this date.  -BL      Posture/Observations    Posture/Observations Comments Forward posture with flexed back and rounded shoulders noted at rollator due to  decreased height of walker. Pt however standing back against the wall is able to correct his posture into upright position and tolerated OH stretching fair with several rest breaks needed this date. Pt was educated on importance of shoulder extension exercises to be completed at home with pictures given this date. Please see GM section for GMC and dyanmic standing task.  -BL      Coordination    Other Coordination Observations BUE coordination task completed in standing bouncing ball to wall and back with 2 drops and requiring 2 rest breaks during 8 minutes standing task.   -BL      Gross Motor Training    Gross Motor Skill, Impairments Detail GMC task and dynamic standing activity completed this date along with obstacle course in therapy gym x2 completed as follows; Pt was able to navigate safely with rollator throughout obstacle course displaying minimal safety concerns locking breaks when appropriate however required SBA and gait belt. Pt utilized straight cane for second attempt at obstacle course with smaller passages and curb step for simulating pt's ability to navigate a curb outdoors on the sidewalk with cane. Pt was able to navigate with moderate safety concerns due to decreased confidence and balance. Pt required CGA for 2nd attempt at course and using straight cane this date with reports of L knee wanting to buckle. Pt completed standing against wall task with 3# weight in hand tapping wall for OH flexion of B shoulders x 10 reps x 2 sets with moderate fatigue and rest break as needed. Pt displayed fair tolerance for 10 reps x 2 sets of modified wall push ups using 2# ball as focal point. Pec stretches completed at doorway x 5 reps holding each rep 5 seconds with pictures given to educate pt on updated HEP. Pt participated in AROM cervical stretches to be completed at home and was able to verbalize full understanding of all new exercises this date.   -BL      Transfers    Transfers, Sit-Stand [place]  conditional independence  -BL      Transfers, Stand-Sit [place] conditional independence  -BL      Transfers, Sit-Stand-Sit, Assist Device --   rollator  -BL      Functional Mobility    Functional Mobility- Ind. Level conditional independence  -BL      Functional Mobility- Device rollator  -BL      Functional Mobility-Distance (Feet) 150  -BL      Functional Mobility- Comment Increased safety with rollator noted this date and was able to tolerate use of Rollator overall better than straight cane during session today.  -BL        User Key  (r) = Recorded By, (t) = Taken By, (c) = Cosigned By    Initials Name Provider Type    SHERIDAN Canales Occupational Therapy Assistant                  Therapy Education  Education Details: Pt was educated on updated HEP of cervical AROM stretches to be completed as tolerated at home with pictures provided along with doorway stretches and shoulder extension stretches using a cane. Pt was able to demonstrate understanding of all HEP  Given: HEP, Fall prevention and home safety  Program: Modified  How Provided: Verbal, Demonstration, Written  Provided to: Patient  Level of Understanding: Teach back education performed, Verbalized, Demonstrated          OT Assessment/Plan       01/04/18 0935 01/04/18 0930    OT Assessment    Assessment Comments  Pt demonstrated decreased endurance this date however did participate well this date requiring mod amount of rest breaks, but completed endurance task, UE strengthening task, and dynamic standing task with no c/o pain or discomfort. Pt displays increased overall safety with rollator use and is making good progress towards unmet goals. Pt was able to complete sit to stand transfers with increased ease this date x 5 and demonstrated increased safety with functional mobility throughout clinic using rollator. Pt had moderate difficulty using straight cane for obstacle course task this date navigating small passages and over curb step  however did very well and displayed increased safety with rollator during obstacle course this date. Pt continues to display deficits with UE strength, endurance, and standing balance for increased independence with ADL'/IADL's and will remain appropriate at this time for skilled OT services.  -BL    OT Plan    OT Frequency  2x/week  -    Predicted Duration of Therapy Intervention (days/wks) x 4 weeks  -BB       User Key  (r) = Recorded By, (t) = Taken By, (c) = Cosigned By    Initials Name Provider Type    XIOMARA Hunt PTA Physical Therapy Assistant    BL SHERIDAN Galicia Occupational Therapy Assistant                 OT Goals       01/04/18 0930       OT Short Term Goals    STG Date to Achieve --   by 4 weeks  -     STG 1 Pt will complete FM activities with 75% accuracy on at least 2/3 sessions.   -BL     STG 1 Progress Progressing  -BL     STG 2 Pt will complete BUE AROM exercises in all planes to increase strength to 5/5 to increase functional independence with ADL's and IADL's.  -     STG 2 Progress Progressing  -     STG 3 Pt will tolerate 5-7 mins of functional standing activity with no lose of balance or increased pain on 2/3 sessions.   -     STG 3 Progress Partially Met  -     STG 3 Progress Comments met 2/3 this date however will continue to address as pt does fatigue with standing balance activities.  -     STG 4 Pt will complete BUE coordination task with 75% accuracy on at least 2/3 sessions.   -     STG 4 Progress Partially Met;Ongoing  -     Long Term Goals    LTG Date to Achieve --   by d/c  -BL     LTG 1 Pt will be independent with progressing HEP on at least 3/3 sessions.  -     LTG 1 Progress Met  -     LTG 1 Progress Comments Pt reports compliance with HEP and was updated this date  -     LTG 2 Pt will be able to simulate and/or report conditional independence with efficient time frame with all ADL's on at least 2/3 sessions.   -     LTG 2 Progress  Progressing  -BL     LTG 3 Pt will be able to simulate and/or report conditional independence with efficient time frame with IADL skills including light house keeping tasks, meal preparation, and general home management skills on at least 2/3 sessions.  -BL     LTG 3 Progress Progressing  -BL     LTG 4 Pt will tolerate 45 minutes treatment session with no fatigue/rest breaks required on at least 2/3 session.   -BL     LTG 4 Progress Progressing  -BL     Time Calculation    OT Goal Re-Cert Due Date 01/10/18  -       User Key  (r) = Recorded By, (t) = Taken By, (c) = Cosigned By    Initials Name Provider Type     Anna Malloy GUEVARA/LOS Occupational Therapy Assistant                              Time Calculation:   OT Start Time: 0930  OT Stop Time: 1015  OT Time Calculation (min): 45 min  Total Timed Code Minutes- OT: 45 minute(s)     Therapy Charges for Today     Code Description Service Date Service Provider Modifiers Qty    91009023902  OT THER PROC EA 15 MIN 1/4/2018 Anna Malloy GUEVARA/L GO 2    [id number]  OT THERAPEUTIC ACT EA 15 MIN 1/4/2018 Anna Malloy GUEVARA/L GO 1                    Anna Malloy GUEVARA/LOS  1/4/2018

## 2018-01-04 NOTE — THERAPY TREATMENT NOTE
Outpatient Physical Therapy Ortho Treatment Note  [facility]     Patient Name: Bakari Dover  : 1955  MRN: [id number]  Today's Date: 2018      Visit Date: 2018  Subjective Improvement: 20%  Visit Number:   3/3  Recert Date:   18  MD Visit:   tbd  Total Approved Visits:  30 per year    Therapy Diagnosis:  CVA with BLE weakness and balance deficits  Visit Dx:    ICD-10-CM ICD-9-CM   1. Stroke of unknown cause I63.9 434.91   2. Weakness R53.1 780.79       There is no problem list on file for this patient.       Past Medical History:   Diagnosis Date   • Diabetes mellitus    • Hypertension    • Kidney stone    • Renal disorder         Past Surgical History:   Procedure Laterality Date   • BACK SURGERY     • BRAIN SURGERY               PT Ortho       18 0800    Precautions and Contraindications    Precautions/Limitations fall precautions  -BB    Posture/Observations    Posture/Observations Comments Gait with rollator  -BB      18 0852    Precautions and Contraindications    Precautions/Limitations fall precautions  -    Posture/Observations    Posture/Observations Comments gait with rollator this date.   -      User Key  (r) = Recorded By, (t) = Taken By, (c) = Cosigned By    Initials Name Provider Type    XIOMARA Hunt PTA Physical Therapy Assistant    CARLOS Thomas PTA Physical Therapy Assistant                            PT Assessment/Plan       18 0935       PT Assessment    Assessment Comments Met 1 STG.  Compliant with current HEP.  Good effort and tolerance to todays treatment.   -BB     PT Plan    PT Frequency 2x/week  -BB     Predicted Duration of Therapy Intervention (days/wks) x 4 weeks  -BB     PT Plan Comments Progress standing endurance as tolerated.   -       User Key  (r) = Recorded By, (t) = Taken By, (c) = Cosigned By    Initials Name Provider Type    XIOMARA Hunt PTA Physical Therapy Assistant                    Exercises        01/04/18 0800          Subjective Comments    Subjective Comments States she is a little sore and joints are cold this morning. No issues with last treatment.  -BB      Subjective Pain    Able to rate subjective pain? yes  -BB      Pre-Treatment Pain Level 0  -BB      Post-Treatment Pain Level 0  -BB      Aquatics    Aquatics performed? No  -BB      Exercise 1    Exercise Name 1 Pro II level 2 UE and LE  -BB      Time (Minutes) 1 10  -BB      Additional Comments seat 11 strength and ROM  -BB      Exercise 2    Exercise Name 2 step ups fwd 4 in  -BB      Reps 2 10  -BB      Additional Comments labored with L LE but able to complete.  Seated rest between legs.  -BB      Exercise 3    Exercise Name 3 seated CR/TR  -BB      Sets 3 3  -BB      Reps 3 10  -BB      Exercise 4    Exercise Name 4 seated add sq  -BB      Sets 4 2  -BB      Reps 4 10  -BB      Time (Seconds) 4 5  -BB      Exercise 5    Exercise Name 5 standing narrow MARC 2 min  -BB      Additional Comments very min sway, SBA only  -BB      Exercise 6    Exercise Name 6 balance master center  -BB      Time (Seconds) 6 30   -BB      Exercise 7    Exercise Name 7 balance master lat to 2nd line  -BB      Time (Seconds) 7 30  -BB      Exercise 8    Exercise Name 8 balance master LOS beginning level  -BB      Reps 8 1  -BB      Exercise 9    Exercise Name 9 balance master lat target beginning level  -BB      Reps 9 1  -BB        User Key  (r) = Recorded By, (t) = Taken By, (c) = Cosigned By    Initials Name Provider Type    XIOMARA Hunt, PTA Physical Therapy Assistant                               PT OP Goals       01/04/18 0800       PT Short Term Goals    STG Date to Achieve 01/18/18  -BB     STG 1 Independent in HEP   -BB     STG 1 Progress Ongoing  -BB     STG 2 Static stand narrow MARC 2' with minimal sway  -BB     STG 2 Progress Met  -BB     STG 3 Knee and ankle MMT 5/5  -BB     STG 3 Progress Progressing  -BB     STG 4 Hip MMT 5/5   -BB     STG 4  Progress Progressing  -BB     STG 5 Ambulate community distances with cane safely   -BB     STG 5 Progress Progressing  -BB     Time Calculation    PT Goal Re-Cert Due Date 01/18/18  -BB       User Key  (r) = Recorded By, (t) = Taken By, (c) = Cosigned By    Initials Name Provider Type    XIOMARA Hunt PTA Physical Therapy Assistant          Therapy Education  Given: HEP  Program: Reinforced  How Provided: Verbal, Demonstration  Provided to: Patient  Level of Understanding: Verbalized, Demonstrated              Time Calculation:   Start Time: 0853  Stop Time: 0931  Time Calculation (min): 38 min  Total Timed Code Minutes- PT: 38 minute(s)    Therapy Charges for Today     Code Description Service Date Service Provider Modifiers Qty    99351746344 HC PT THER PROC EA 15 MIN 1/4/2018 Natalya Hunt PTA GP 3                    Natalya Hunt PTA  1/4/2018

## 2018-01-10 ENCOUNTER — HOSPITAL ENCOUNTER (OUTPATIENT)
Dept: OCCUPATIONAL THERAPY | Facility: HOSPITAL | Age: 63
Setting detail: THERAPIES SERIES
Discharge: HOME | End: 2018-01-10

## 2018-01-10 ENCOUNTER — HOSPITAL ENCOUNTER (OUTPATIENT)
Dept: PHYSICAL THERAPY | Facility: HOSPITAL | Age: 63
Setting detail: THERAPIES SERIES
Discharge: HOME | End: 2018-01-10

## 2018-01-10 DIAGNOSIS — Z74.09 IMPAIRED MOBILITY AND ADLS: Primary | ICD-10-CM

## 2018-01-10 DIAGNOSIS — R53.1 WEAKNESS: ICD-10-CM

## 2018-01-10 DIAGNOSIS — Z78.9 IMPAIRED INSTRUMENTAL ACTIVITIES OF DAILY LIVING (IADL): ICD-10-CM

## 2018-01-10 DIAGNOSIS — Z78.9 IMPAIRED MOBILITY AND ADLS: Primary | ICD-10-CM

## 2018-01-10 DIAGNOSIS — M62.81 MUSCLE WEAKNESS: ICD-10-CM

## 2018-01-10 DIAGNOSIS — I63.9 STROKE OF UNKNOWN CAUSE (HCC): Primary | ICD-10-CM

## 2018-01-10 PROCEDURE — 97110 THERAPEUTIC EXERCISES: CPT

## 2018-01-10 PROCEDURE — 97530 THERAPEUTIC ACTIVITIES: CPT

## 2018-01-10 NOTE — THERAPY TREATMENT NOTE
Outpatient Physical Therapy Ortho Treatment Note  [facility]     Patient Name: Bakari Dover  : 1955  MRN: [id number]  Today's Date: 1/10/2018      Visit Date: 01/10/2018    Subjective Improvement: 20%  Attendance:   (30/yr)  Next MD Visit : PRN  Recert Date:  18      Therapy Diagnosis:  CVA with BLE weakness and balance deficits      Visit Dx:    ICD-10-CM ICD-9-CM   1. Stroke of unknown cause I63.9 434.91   2. Weakness R53.1 780.79       There is no problem list on file for this patient.       Past Medical History:   Diagnosis Date   • Diabetes mellitus    • Hypertension    • Kidney stone    • Renal disorder         Past Surgical History:   Procedure Laterality Date   • BACK SURGERY     • BRAIN SURGERY               PT Ortho       01/10/18 08    Precautions and Contraindications    Precautions/Limitations fall precautions  -CARLOS    Posture/Observations    Posture/Observations Comments gait w/ rollator  -      User Key  (r) = Recorded By, (t) = Taken By, (c) = Cosigned By    Initials Name Provider Type    CARLOS Thomas PTA Physical Therapy Assistant                            PT Assessment/Plan       01/10/18 08       PT Assessment    Assessment Comments CTSIB testing no change from inital eval.  fatigues quickly still and needs VC to slow down with exercises.   -     PT Plan    PT Frequency 2x/week  -CARLOS     Predicted Duration of Therapy Intervention (days/wks) 4 weeks  -     PT Plan Comments Progress standing and balance as able. Recheck scheduled for next week.   -CARLOS       User Key  (r) = Recorded By, (t) = Taken By, (c) = Cosigned By    Initials Name Provider Type    CARLOS Thomas PTA Physical Therapy Assistant                    Exercises       01/10/18 0855          Subjective Comments    Subjective Comments Had a good weekend.  Feels that he is improving.   -CARLOS      Subjective Pain    Able to rate subjective pain? yes  -CARLOS      Pre-Treatment Pain Level 0  -CARLOS       Post-Treatment Pain Level 0  -KH      Exercise 1    Exercise Name 1 pro ll LE strength   -KH      Time (Minutes) 1 10'  -KH      Additional Comments level 2.5  -KH      Exercise 2    Exercise Name 2 LAQ w/ aw  -KH      Sets 2 3  -KH      Reps 2 10  -KH      Additional Comments 1#  -KH      Exercise 3    Exercise Name 3 seated march w/ aw  -KH      Sets 3 3  -KH      Reps 3 10  -KH      Additional Comments 1#  -KH      Exercise 4    Exercise Name 4 standing hip abd B  -KH      Sets 4 2  -KH      Reps 4 10  -KH      Additional Comments 1# aw  -KH      Exercise 5    Exercise Name 5 stanidng hip ext B  -KH      Sets 5 2  -KH      Reps 5 10  -KH      Additional Comments 1# aw  -KH      Exercise 6    Exercise Name 6 mini squats  -KH      Sets 6 2  -KH      Reps 6 10  -KH      Exercise 7    Exercise Name 7 Balance Master CTSIB testing  -KH      Time (Minutes) 7 5'  -KH        User Key  (r) = Recorded By, (t) = Taken By, (c) = Cosigned By    Initials Name Provider Type    CARLOS Thomas PTA Physical Therapy Assistant                               PT OP Goals       01/10/18 0855       PT Short Term Goals    STG Date to Achieve 01/18/18  -KH     STG 1 Independent in HEP   -KH     STG 1 Progress Ongoing  -KH     STG 2 Static stand narrow MARC 2' with minimal sway  -KH     STG 2 Progress Met  -KH     STG 3 Knee and ankle MMT 5/5  -KH     STG 3 Progress Progressing  -KH     STG 4 Hip MMT 5/5   -KH     STG 4 Progress Progressing  -KH     STG 5 Ambulate community distances with cane safely   -KH     STG 5 Progress Progressing  -KH     Time Calculation    PT Goal Re-Cert Due Date 01/18/18  -KH       User Key  (r) = Recorded By, (t) = Taken By, (c) = Cosigned By    Initials Name Provider Type    CARLOS Thomas PTA Physical Therapy Assistant                         Time Calculation:   Start Time: 0855  Stop Time: 0935  Time Calculation (min): 40 min  Total Timed Code Minutes- PT: 40 minute(s)    Therapy Charges for Today     Code  Description Service Date Service Provider Modifiers Qty    28682985184 HC PT THER PROC EA 15 MIN 1/10/2018 Rekha Thomas, EDGAR GP 3                    Rekha Thomas PTA  1/10/2018

## 2018-01-10 NOTE — THERAPY TREATMENT NOTE
Outpatient Occupational Therapy Rehab Program Treatment  [facility]     Patient Name: Bakari Dover  : 1955  MRN: [id number]  Today's Date: 1/10/2018        Visit Date: 01/10/2018    There is no problem list on file for this patient.  Visit Number: 8/8  % Improvement: 20  Recert Date: 01/10/2018  MD visit: 2018    Insurance Visits Approved:  total/ 14 remaining       Past Medical History:   Diagnosis Date   • Diabetes mellitus    • Hypertension    • Kidney stone    • Renal disorder         Past Surgical History:   Procedure Laterality Date   • BACK SURGERY     • BRAIN SURGERY           Visit Dx:    ICD-10-CM ICD-9-CM   1. Impaired mobility and ADLs Z74.09 799.89   2. Impaired instrumental activities of daily living (IADL) R53.81 799.3   3. Muscle weakness M62.81 728.87               OT Neuro       01/10/18 0935          Subjective Comments    Subjective Comments Pt present at OT session after conclusion of PT. Pt reports he has 4/10 lower back pain. Pt reports he has a MD appointment on 2018.    -MR      Precautions and Contraindications    Precautions/Limitations fall precautions  -MR      Subjective Pain    Able to rate subjective pain? yes  -MR      Pre-Treatment Pain Level 4  -MR      Post-Treatment Pain Level 4  -MR      Subjective Pain Comment Pt reports his lower back pain began prior to OT session, he reported the same level of pain throughout the session. He was agreeable to continue with OT session.   -MR      Pain Assessment    Pain Assessment 0-10  -MR      Pain Score 4  -MR      Post Pain Score 4  -MR      Pain Type Chronic pain  -MR      Pain Location Back  -MR      Pain Orientation Lower  -MR      Pain Intervention(s) Repositioned;Distraction;Rest  -MR      Cognitive Assessment/Intervention    Current Cognitive/Communication Assessment functional  -MR      Orientation Status oriented x 4  -MR      Follows Commands/Answers Questions 100% of the time  -MR      Personal  Safety mild impairment   improving with rollator safety  -MR      Personal Safety Interventions fall prevention program maintained;gait belt;muscle strengthening facilitated;nonskid shoes/slippers when out of bed;supervised activity  -MR      Sensation    Additional Comments Discussed with pt safety awareness and the importance related to decreased sensation in B hands. Will update goals accordingly to include focused attention to increasing overall awareness on tomorrows re-assessment.   -MR      Proprioception    Proprioception WFL  -MR      Posture/Observations    Posture/Observations Comments Pt displayed increased awareness of standing posture during standing balance activities this date. Pt did required min VC to hold shoulders back and tuck buttock in.   -MR      Coordination    Other Coordination Observations Pt completed BUE coordination task in standing this date, he stood for a continuous 7 minutes to complete a dribbling task with large light weight ball, he lost ball once with left side. Pt did not display any LOB.   -MR      Crossing Midline Right:;Left:;Intact   mild difficulty  -MR      Bilateral Integration Right:;Left:;Intact  -MR      Gross Motor Training    Gross Motor Skill, Impairments Detail Pt completed BUE AROM exercises in standing with moderate rest breaks and 4# dowel lashaun, 1 x 15 of: shoulder flexion, shoulder extension, chest press, and bicep curl. Pt completed exercises in standing with no LOB. Pt completed B integration activity with 4# weighted medicine ball in standing with no AD requiring pt to reach outside of MARC and above shoulder level and below knee level with no LOB. Pt reported moderate fatigue following task. Pt completed truck strengthening exercises with 4# medicine ball while seated on mat table, pt displayed increased fatigue during todays session and required increased rest breaks.   -MR      Transfers    Transfers, Sit-Stand [place] conditional independence  -MR       Transfers, Stand-Sit [place] conditional independence  -MR      Transfers, Sit-Stand-Sit, Assist Device --   rollator  -MR      Functional Mobility    Functional Mobility- Ind. Level conditional independence  -MR      Functional Mobility- Device rollator  -MR        User Key  (r) = Recorded By, (t) = Taken By, (c) = Cosigned By    Initials Name Provider Type    MR Michelle Santana, OT Occupational Therapist           Hand Therapy (last 24 hours)      Hand Eval       01/10/18 0935          Therapy Education    Education Details Pt educated on continuing current HEP, reviewed various stretches and exercises to ensure understanding and compliance. Pt responded appropriately. Educated pt on sensation and safety awareness at home during cooking tasks.  -MR      Given HEP;Fall prevention and home safety  -MR      Program Reinforced  -MR      How Provided Verbal;Demonstration  -MR      Provided to Patient  -MR      Level of Understanding Verbalized;Demonstrated  -MR        User Key  (r) = Recorded By, (t) = Taken By, (c) = Cosigned By    Initials Name Provider Type    MR Michelle Santana, OT Occupational Therapist                Therapy Education  Education Details: Pt educated on continuing current HEP, reviewed various stretches and exercises to ensure understanding and compliance. Pt responded appropriately. Educated pt on sensation and safety awareness at home during cooking tasks.  Given: HEP, Fall prevention and home safety  Program: Reinforced  How Provided: Verbal, Demonstration  Provided to: Patient  Level of Understanding: Verbalized, Demonstrated          OT Assessment/Plan       01/10/18 0935 01/10/18 0855    OT Assessment    Assessment Comments Pt demonstrated decreased endurance during todays session, he did engage and participate appropriately. He reported 4/10 back pain throughout session but requested to continue with therapy. Pt had PT session prior to OT this date and reported fatigue at the  beginning of today's treatment session. Pt would continue to benefit from skilled OT services to address decreased strength, balance, safety awareness, endurance, activity tolerance, FMC/GMC, and independence with ADL's and IADL's. Recommend cont POC.    -MR     OT Plan    OT Frequency 2x/week  -MR     Predicted Duration of Therapy Intervention (days/wks)  4 weeks  -CARLOS      User Key  (r) = Recorded By, (t) = Taken By, (c) = Cosigned By    Initials Name Provider Type    CARLOS Thomas, PTA Physical Therapy Assistant    MR Michelle Santana, OT Occupational Therapist                 OT Goals       01/10/18 0935       OT Short Term Goals    STG Date to Achieve --   by 4 weeks  -MR     STG 1 Pt will complete FM activities with 75% accuracy on at least 2/3 sessions.   -MR     STG 1 Progress Progressing  -MR     STG 2 Pt will complete BUE AROM exercises in all planes to increase strength to 5/5 to increase functional independence with ADL's and IADL's.  -MR     STG 2 Progress Progressing  -MR     STG 3 Pt will tolerate 5-7 mins of functional standing activity with no lose of balance or increased pain on 2/3 sessions.   -MR     STG 3 Progress Partially Met  -MR     STG 3 Progress Comments Pt achieved this goal during today's session, he tolerated standing 3 x 5 minutes with no LOB during functional activities.   -MR     STG 4 Pt will complete BUE coordination task with 75% accuracy on at least 2/3 sessions.   -MR     STG 4 Progress Partially Met;Ongoing  -MR     Long Term Goals    LTG Date to Achieve --   by d/c  -MR     LTG 1 Pt will be independent with progressing HEP on at least 3/3 sessions.  -MR     LTG 1 Progress Met;Ongoing  -MR     LTG 2 Pt will be able to simulate and/or report conditional independence with efficient time frame with all ADL's on at least 2/3 sessions.   -MR     LTG 2 Progress Progressing  -MR     LTG 3 Pt will be able to simulate and/or report conditional independence with efficient time frame with  IADL skills including light house keeping tasks, meal preparation, and general home management skills on at least 2/3 sessions.  -MR     LTG 3 Progress Progressing  -MR     LTG 4 Pt will tolerate 45 minutes treatment session with no fatigue/rest breaks required on at least 2/3 session.   -MR     LTG 4 Progress Progressing  -MR     Time Calculation    OT Goal Re-Cert Due Date 01/10/18  -MR       User Key  (r) = Recorded By, (t) = Taken By, (c) = Cosigned By    Initials Name Provider Type     Michelle Santana OT Occupational Therapist                              Time Calculation:   OT Start Time: 0935  OT Stop Time: 1014  OT Time Calculation (min): 39 min  Total Timed Code Minutes- OT: 39 minute(s)     Therapy Charges for Today     Code Description Service Date Service Provider Modifiers Qty    83191762718  OT THER PROC EA 15 MIN 1/10/2018 Michelle Santana OT GO 2    [id number]  OT THERAPEUTIC ACT EA 15 MIN 1/10/2018 iMchelle Santana OT GO 1                    Michelle Santana OT  1/10/2018

## 2018-01-11 ENCOUNTER — HOSPITAL ENCOUNTER (OUTPATIENT)
Dept: OCCUPATIONAL THERAPY | Facility: HOSPITAL | Age: 63
Setting detail: THERAPIES SERIES
Discharge: HOME | End: 2018-01-11

## 2018-01-11 ENCOUNTER — HOSPITAL ENCOUNTER (OUTPATIENT)
Dept: PHYSICAL THERAPY | Facility: HOSPITAL | Age: 63
Setting detail: THERAPIES SERIES
Discharge: HOME | End: 2018-01-11

## 2018-01-11 DIAGNOSIS — I63.9 STROKE OF UNKNOWN CAUSE (HCC): Primary | ICD-10-CM

## 2018-01-11 DIAGNOSIS — R53.1 WEAKNESS: ICD-10-CM

## 2018-01-11 DIAGNOSIS — Z78.9 IMPAIRED INSTRUMENTAL ACTIVITIES OF DAILY LIVING (IADL): ICD-10-CM

## 2018-01-11 DIAGNOSIS — Z78.9 IMPAIRED MOBILITY AND ADLS: Primary | ICD-10-CM

## 2018-01-11 DIAGNOSIS — M62.81 MUSCLE WEAKNESS: ICD-10-CM

## 2018-01-11 DIAGNOSIS — Z74.09 IMPAIRED MOBILITY AND ADLS: Primary | ICD-10-CM

## 2018-01-11 PROCEDURE — 97110 THERAPEUTIC EXERCISES: CPT

## 2018-01-11 PROCEDURE — G8988 SELF CARE GOAL STATUS: HCPCS

## 2018-01-11 PROCEDURE — G8987 SELF CARE CURRENT STATUS: HCPCS

## 2018-01-11 PROCEDURE — 97530 THERAPEUTIC ACTIVITIES: CPT

## 2018-01-11 NOTE — THERAPY TREATMENT NOTE
Outpatient Physical Therapy Ortho Treatment Note  [facility]     Patient Name: Bakari Dover  : 1955  MRN: [id number]  Today's Date: 2018      Visit Date: 2018     Subjective Improvement: 30%  Attendance:  / (30/yr)  Next MD Visit : PRN  Recert Date:  18      Therapy Diagnosis:  CVA with BLE weakness and balance deficits        Visit Dx:    ICD-10-CM ICD-9-CM   1. Stroke of unknown cause I63.9 434.91   2. Weakness R53.1 780.79       There is no problem list on file for this patient.       Past Medical History:   Diagnosis Date   • Diabetes mellitus    • Hypertension    • Kidney stone    • Renal disorder         Past Surgical History:   Procedure Laterality Date   • BACK SURGERY     • BRAIN SURGERY               PT Ortho       18 0805    Precautions and Contraindications    Precautions/Limitations fall precautions  -    Precautions Monitor Left Knee (arthritis and gives at times)  -    Posture/Observations    Posture/Observations Comments gait w/ rollator   -      01/10/18 0855    Precautions and Contraindications    Precautions/Limitations fall precautions  -    Posture/Observations    Posture/Observations Comments gait w/ rollator  -      User Key  (r) = Recorded By, (t) = Taken By, (c) = Cosigned By    Initials Name Provider Type    CARLOS Thomas PTA Physical Therapy Assistant           Hand Therapy (last 24 hours)      Hand Desiree       01/10/18 0935          Therapy Education    Education Details Pt educated on continuing current HEP, reviewed various stretches and exercises to ensure understanding and compliance. Pt responded appropriately. Educated pt on sensation and safety awareness at home during cooking tasks.  -MR      Given HEP;Fall prevention and home safety  -MR      Program Reinforced  -MR      How Provided Verbal;Demonstration  -MR      Provided to Patient  -MR      Level of Understanding Verbalized;Demonstrated  -MR        User Key  (r) =  "Recorded By, (t) = Taken By, (c) = Cosigned By    Initials Name Provider Type    MR Michelle KUMAR Max, OT Occupational Therapist                          PT Assessment/Plan       01/11/18 0805       PT Assessment    Assessment Comments minimal sway with airex balance activites.  Still continues to have pain in the left knee with standing exercises.   -KH     PT Plan    PT Frequency 2x/week  -KH     Predicted Duration of Therapy Intervention (days/wks) 4 weeks  -KH     PT Plan Comments Continue with balance and strengthening exercies.  Possible leg press next.   -KH       User Key  (r) = Recorded By, (t) = Taken By, (c) = Cosigned By    Initials Name Provider Type    CARLOS Thomas, PTA Physical Therapy Assistant                    Exercises       01/11/18 0805          Subjective Comments    Subjective Comments Lilttle sore after last treatment but no increased pain.   -KH      Subjective Pain    Able to rate subjective pain? yes  -KH      Pre-Treatment Pain Level 0  -KH      Post-Treatment Pain Level 0  -KH      Exercise 1    Exercise Name 1 pro ll LE strength  -KH      Time (Minutes) 1 10'  -KH      Additional Comments level 2.5  -KH      Exercise 2    Exercise Name 2 step ups B  -KH      Sets 2 2  -KH      Reps 2 10  -KH      Additional Comments fwd, 4 inch  -KH      Exercise 3    Exercise Name 3 standing hip abd B  -KH      Sets 3 2  -KH      Reps 3 10  -KH      Additional Comments 1# aw  -KH      Exercise 4    Exercise Name 4 standing ham curls B  -KH      Sets 4 2  -KH      Reps 4 10  -KH      Additional Comments 1# aw  -KH      Exercise 5    Exercise Name 5 Airex-Balance FA/EO w/ perturbations  -KH      Sets 5 3  -KH      Time (Seconds) 5 30\"  -KH      Exercise 6    Exercise Name 6 Airex-Balance FT/EO  -KH      Sets 6 3  -KH      Time (Seconds) 6 30\"  -KH      Additional Comments w/ perturbations  -KH        User Key  (r) = Recorded By, (t) = Taken By, (c) = Cosigned By    Initials Name Provider Type    CARLOS " Rekha Thomas PTA Physical Therapy Assistant                               PT OP Goals       01/11/18 0805       PT Short Term Goals    STG Date to Achieve 01/18/18  -     STG 1 Independent in HEP   -     STG 1 Progress Ongoing  -     STG 2 Static stand narrow MARC 2' with minimal sway  -     STG 2 Progress Met  -     STG 3 Knee and ankle MMT 5/5  -     STG 3 Progress Progressing  -     STG 4 Hip MMT 5/5   -     STG 4 Progress Progressing  -     STG 5 Ambulate community distances with cane safely   -     STG 5 Progress Progressing  -     Time Calculation    PT Goal Re-Cert Due Date 01/18/18  -       User Key  (r) = Recorded By, (t) = Taken By, (c) = Cosigned By    Initials Name Provider Type    CARLOS Thomas PTA Physical Therapy Assistant                         Time Calculation:   Start Time: 0805  Stop Time: 0845  Time Calculation (min): 40 min  Total Timed Code Minutes- PT: 40 minute(s)    Therapy Charges for Today     Code Description Service Date Service Provider Modifiers Qty    98927241803 HC PT THER PROC EA 15 MIN 1/11/2018 Rekha Thomas PTA GP 3                    Rekha Thomas PTA  1/11/2018

## 2018-01-11 NOTE — THERAPY RE-EVALUATION
Outpatient Occupational Therapy Rehab Program Re-Assessment  [facility]     Patient Name: Bakari Dover  : 1955  MRN: [id number]  Today's Date: 2018      Visit Date: 2018    There is no problem list on file for this patient.  Visit Number: 9/9  % Improvement: 40%  Recert Date: 2018  MD visit: 2018       Past Medical History:   Diagnosis Date   • Diabetes mellitus    • Hypertension    • Kidney stone    • Renal disorder         Past Surgical History:   Procedure Laterality Date   • BACK SURGERY     • BRAIN SURGERY           Visit Dx:    ICD-10-CM ICD-9-CM   1. Impaired mobility and ADLs Z74.09 799.89   2. Impaired instrumental activities of daily living (IADL) R53.81 799.3   3. Muscle weakness M62.81 728.87             Patient History       18 0845          History    Chief Complaint Balance Problems;Difficulty with daily activities;Falls/history of falls;Fatigue/poor endurance;Impaired sensation;Muscle tenderness;Muscle weakness;Numbness;Pain;Tingling;Difficulty Walking  -MR      Type of Pain Back pain;Shoulder pain  -MR      Brief Description of Current Complaint Pt has been engaging in OT services for one month, he has recently began PT services. Pt reports a 40% improvement overall. 2018 MD appointment with neurologist and primary MD apointment tentatively for March.   -MR      Patient/Caregiver Goals Return to prior level of function;Improve mobility;Improve strength;Know what to do to help the symptoms;Relieve pain;Return to work  -MR      Patient's Rating of General Health Good  -MR      Occupation/sports/leisure activities Pt enjoys surveying buildings, design work, reading, and social media  -MR      Pain     Pain Location Back  -MR      Pain at Present 2  -MR      Pain at Best 0  -MR      Pain at Worst 7  -MR      Pain Frequency Constant/continuous  -MR      Pain Description Discomfort;Dull;Sharp  -MR      What Performance Factors Make the Current  Problem(s) WORSE? After sitting for long periods and when first getting up in am  -MR      What Performance Factors Make the Current Problem(s) BETTER? rest  -MR      Difficulties at work? 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 Pt reports his endurance is improving and he does not fatigue as quickly, there is still some delay in morning ADL's. Pt reports he is implementing energy conservation techniques during light house however he continues to fatigue quickly when coompleteing these tasks.   -MR      Difficulties with recreational activities? Pt reports his endurance level and hand function limit his particicpation in activites he enjoys including work.   -MR      Fall Risk Assessment    Any falls in the past year: Yes   no falls since beginning therapy  -MR      Daily Activities    Pt Participated in POC and Goals Yes  -MR        User Key  (r) = Recorded By, (t) = Taken By, (c) = Cosigned By    Initials Name Provider Type     Michelle Santana, OT Occupational Therapist                  OT Neuro       01/11/18 5223          Subjective Comments    Subjective Comments Pt reported a 40% improvement since beginning out patient therapy. Pt reports his endurance and activity tolerance have improved but he still is not at PLOF. Pt reports his hand function is still a limiting factor for his participation in ADL's.    -MR      Precautions and Contraindications    Precautions/Limitations fall precautions  -MR      Subjective Pain    Able to rate subjective pain? yes  -MR      Pre-Treatment Pain Level 2  -MR      Post-Treatment Pain Level 2  -MR      Subjective Pain Comment Lower back and shoulders  -MR      Pain  Assessment    Pain Assessment 0-10  -MR      Pain Score 2  -MR      Post Pain Score 2  -MR      Cognitive Assessment/Intervention    Current Cognitive/Communication Assessment functional  -MR      Orientation Status oriented x 4  -MR      Follows Commands/Answers Questions 100% of the time  -MR      Personal Safety mild impairment  -MR      Personal Safety Interventions fall prevention program maintained;gait belt;muscle strengthening facilitated;nonskid shoes/slippers when out of bed;supervised activity  -MR      Cognition Comments Pt completed the SLUMS assessment this date, he scored a 27/30. Pt demonstrated improved problem solving and direction following during assessment.   -MR      Sensation    Light Touch Partial deficits in the RUE;Partial deficits in the LUE   per pt report; in clinic pt able to respond appropriately   -MR      Sharp/Dull Partial deficits in the RUE;Partial deficits in the LUE   per pt report  -MR      Proprioception    Proprioception WFL  -MR      Posture/Observations    Posture/Observations Comments During sit > stand t/f pt required increased time and VC to reach appropriate standing posture, he reported he was stiff from sitting for a prolonged period  -MR      Coordination    Coordination Tests 9-Hole Peg;Finger to nose eyes open;Rapid Alternating  -MR      Rapid Alternating Right:;Left:;Impaired   mild delay - improved  -MR      Finger to Nose Eyes Open Bilteral:;Intact  -MR      Bilateral Integration Right:;Left:;Intact   improving  -MR      9-Hole Peg Left 35, 1 drop  -MR      9-Hole Peg Right 29.5  -MR      ROM (Range of Motion)    General ROM no range of motion deficits identified  -MR      General ROM Detail BUE WFL  -MR      MMT (Manual Muscle Testing)    General MMT Assessment upper extremity strength deficits identified  -MR      General MMT Assessment Detail BUE grossly 4+/5  -MR      Transfers    Transfers, Sit-Stand [place] conditional independence  -MR       Transfers, Stand-Sit [place] conditional independence  -MR      Transfers, Sit-Stand-Sit, Assist Device other (see comments)   rollator  -MR      Functional Mobility    Functional Mobility- Ind. Level conditional independence  -MR      Functional Mobility- Device rollator  -MR      Functional Mobility- Comment Increased safety awareness with rollator use and locking brakes  -MR        User Key  (r) = Recorded By, (t) = Taken By, (c) = Cosigned By    Initials Name Provider Type    MR Michelle Vancemond, OT Occupational Therapist           Hand Therapy (last 24 hours)      Hand Eval       01/11/18 0860          Subjective Comments    Subjective Comments Please see OT Nuero  -MR      Subjective Pain    Able to rate subjective pain? yes  -MR      Pre-Treatment Pain Level 2  -MR      Post-Treatment Pain Level 2  -MR       Strength Right    Right  Test 1 62  -MR      Right  Test 2 64  -MR      Right  Test 3 63  -MR       Strength Average Right 63  -MR       Strength Left    Left  Test 1 70  -MR      Left  Test 2 76  -MR      Left  Test 3 71  -MR       Strength Average Left 72.33  -MR      Right Hand Strength - Pinch (lbs)    Lateral 19 lbs  -MR      Tip (2 point) 16 lbs  -MR      Three Jaw Atul 17 lbs  -MR      Left Hand Strength - Pinch (lbs)    Lateral 18 lbs  -MR      Tip (2 point) 11 lbs  -MR      Three Jaw Atul 17 lbs  -MR      Therapy Education    Education Details Pt educated on progressed POC. Pt educated on importance of continuing complaince with HEP. Pt educated on energy conservation and increasing safety awareness at home.   -MR      Given HEP;Symptoms/condition management;Pain management;Posture/body mechanics;Fall prevention and home safety  -MR      Program Progressed  -MR      How Provided Verbal;Demonstration  -MR      Provided to Patient  -MR      Level of Understanding Verbalized;Demonstrated  -MR        User Key  (r) = Recorded By, (t) = Taken By, (c) =  Cosigned By    Initials Name Provider Type     Michelle KUMAR Santana, OT Occupational Therapist                Therapy Education  Education Details: Pt educated on progressed POC. Pt educated on importance of continuing complaince with HEP. Pt educated on energy conservation and increasing safety awareness at home.   Given: HEP, Symptoms/condition management, Pain management, Posture/body mechanics, Fall prevention and home safety  Program: Progressed  How Provided: Verbal, Demonstration  Provided to: Patient  Level of Understanding: Verbalized, Demonstrated          OT Goals       01/11/18 1034 01/11/18 0845    OT Short Term Goals    STG Date to Achieve  --   by 4 weeks  -MR    STG 1  Pt will complete FM activities with 75% accuracy on at least 2/3 sessions.   -MR    STG 1 Progress  Progressing  -MR    STG 2  Pt will complete BUE AROM exercises in all planes to increase strength to 5/5 to increase functional independence with ADL's and IADL's.  -MR    STG 2 Progress  Progressing  -MR    STG 3  Pt will tolerate 5-7 mins of functional standing activity with no lose of balance or increased pain on 2/3 sessions.   -MR    STG 3 Progress  Partially Met  -MR    STG 4  Pt will complete BUE coordination task with 75% accuracy on at least 2/3 sessions.   -MR    STG 4 Progress  Partially Met;Ongoing  -MR    Long Term Goals    LTG Date to Achieve  --   by d/c  -MR    LTG 1  Pt will be independent with progressing HEP on at least 3/3 sessions.  -MR    LTG 1 Progress  Met;Ongoing  -MR    LTG 2  Pt will be able to simulate and/or report conditional independence with efficient time frame with all ADL's on at least 2/3 sessions.   -MR    LTG 2 Progress  Progressing  -MR    LTG 3  Pt will be able to simulate and/or report conditional independence with efficient time frame with IADL skills including light house keeping tasks, meal preparation, and general home management skills on at least 2/3 sessions.  -MR    LTG 3 Progress   Progressing  -MR    LTG 4  Pt will tolerate 45 minutes treatment session with no fatigue/rest breaks required on at least 2/3 session.   -MR    LTG 4 Progress  Progressing  -MR    LTG 5  Pt will demonstrate/report increased spatial awareness and safety awareness during cooking task/functional activity on 2/3 sessions.   -MR    LTG 5 Progress  New  -MR    LTG 6  Pt will engage in BUE ROM exercises/streches with 75% improvement of function on 2/3 sessions.   -MR    LTG 6 Progress  New  -MR    Time Calculation    OT Goal Re-Cert Due Date 02/08/18  -MR 02/08/18  -MR      User Key  (r) = Recorded By, (t) = Taken By, (c) = Cosigned By    Initials Name Provider Type    MR Michelle Santana, OT Occupational Therapist                OT Assessment/Plan       01/11/18 0845 01/11/18 0805    OT Assessment    Functional Limitations Decreased safety during functional activities;Impaired locomotion;Limitation in home management;Limitations in community activities;Limitations in functional capacity and performance;Performance in leisure activities;Performance in self-care ADL;Performance in work activities  -MR     Impairments Balance;Cognition;Coordination;Dexterity;Endurance;Impaired flexibility;Impaired muscle endurance;Impaired muscle length;Impaired muscle power;Impaired postural alignment;Impaired sensory integrity;Joint integrity;Joint mobility;Locomotion;Motor function;Muscle strength;Pain;Poor body mechanics;Posture;Sensation;Impaired aerobic capacity;Impaired neuromotor development  -MR     Assessment Comments OT re-assessment completed this date. Pt has improved his safety awareness with his rollator. Pt completed quick DASH, 9-hole peg test, SLUMS, ROM/MMT/Sensation testing this date. Pt continues to display decreased endurance and activity tolerance with ADL's and IADL's. Pt has identified hand function is limiting participation with ADL safety d/t decreased sensation and awareness. Pt would cont to benefit from skilled  OT services d/t decreased strength, balance, endurance, FMC/GMC, sensory/safety awareness, and functional independence with ADL's and IADL's.   -MR     OT Diagnosis impaired mobility, ADL's, and IADL's  -MR     OT Rehab Potential Good  -MR     Patient/caregiver participated in establishment of treatment plan and goals Yes  -MR     Patient would benefit from skilled therapy intervention Yes  -MR     OT Plan    OT Frequency 2x/week  -MR     Predicted Duration of Therapy Intervention (days/wks) Pt will be re-assessed in 30 days  -MR 4 weeks  -    Planned Therapy Interventions (Optional Details) balance training;bed mobility training;home exercise program;joint mobilization;manual therapy techniques;motor coordination training;neuromuscular re-education;orthotic fitting/training;patient/family education;postural re-education;prosthetic fitting/training;ROM (Range of Motion);stair training;strengthening;stretching;swiss ball techniques;transfer training;wheelchair management/propulsion training  -MR     OT Plan Comments Recommend cont skilled OT services to help pt reach maximum level of independence with ADL's and IADL's.   -MR       User Key  (r) = Recorded By, (t) = Taken By, (c) = Cosigned By    Initials Name Provider Type    CARLOS Thomas PTA Physical Therapy Assistant    MR Michelle Santana, OT Occupational Therapist                  Outcome Measure Options: 9 Hole Peg, Quick DASH  9 Hole Peg  9-Hole Peg Left: 35, 1 drop  9-Hole Peg Right: 29.5  Quick DASH  Open a tight or new jar.: Moderate Difficulty  Do heavy household chores (e.g., wash walls, wash floors): Severe Difficulty  Carry a shopping bag or briefcase: Moderate Difficulty  Wash your back: Moderate Difficulty  Use a knife to cut food: Moderate Difficulty  Recreational activities in which you take some force or impact through your arm, should or hand (e.g. golf, hammering, tennis, etc.): Moderate Difficulty  During the past week, to what extent has  your arm, shoulder, or hand problem interfered with your normal social activites with family, friends, neighbors or groups?: Moderately  During the past week, were you limited in your work or other regular daily activities as a result of your arm, shoulder or hand problem?: Slightly Limited  Arm, Shoulder, or hand pain: Mild  Tingling (pins and needles) in your arm, shoulder, or hand: Moderate  During the past week, how much difficulty have you had sleeping because of the pain in your arm, shoulder or hand?: Mild Difficulty  Number of Questions Answered: 11  Quick DASH Score: 45.45         Time Calculation:   OT Start Time: 0845  OT Stop Time: 0951  OT Time Calculation (min): 66 min  Total Timed Code Minutes- OT: 66 minute(s)     Therapy Charges for Today     Code Description Service Date Service Provider Modifiers Qty    33823756938  OT SELFCARE CURRENT 1/11/2018 SHAHIDA Fontenot, CK 1    [id number]  OT SELFCARE PROJECTED 1/11/2018 SHAHIDA Fontenot, CI 1    [id number]  OT THERAPEUTIC ACT EA 15 MIN 1/11/2018 Michelle Santana OT GO 4          OT G-codes  OT Professional Judgement Used?: Yes  OT Functional Scales Options: 9 Hole Peg, Quick DASH  Score: 45.45  Functional Limitation: Self care  Self Care Current Status (): At least 40 percent but less than 60 percent impaired, limited or restricted  Self Care Goal Status (): At least 1 percent but less than 20 percent impaired, limited or restricted       Michelle Santana OT  1/11/2018

## 2018-01-17 ENCOUNTER — APPOINTMENT (OUTPATIENT)
Dept: OCCUPATIONAL THERAPY | Facility: HOSPITAL | Age: 63
End: 2018-01-17

## 2018-01-17 ENCOUNTER — APPOINTMENT (OUTPATIENT)
Dept: PHYSICAL THERAPY | Facility: HOSPITAL | Age: 63
End: 2018-01-17

## 2018-01-18 ENCOUNTER — HOSPITAL ENCOUNTER (OUTPATIENT)
Dept: OCCUPATIONAL THERAPY | Facility: HOSPITAL | Age: 63
Setting detail: THERAPIES SERIES
Discharge: HOME | End: 2018-01-18

## 2018-01-18 ENCOUNTER — HOSPITAL ENCOUNTER (OUTPATIENT)
Dept: PHYSICAL THERAPY | Facility: HOSPITAL | Age: 63
Setting detail: THERAPIES SERIES
Discharge: HOME | End: 2018-01-18

## 2018-01-18 DIAGNOSIS — Z74.09 IMPAIRED MOBILITY AND ADLS: Primary | ICD-10-CM

## 2018-01-18 DIAGNOSIS — R53.1 WEAKNESS: ICD-10-CM

## 2018-01-18 DIAGNOSIS — Z78.9 IMPAIRED INSTRUMENTAL ACTIVITIES OF DAILY LIVING (IADL): ICD-10-CM

## 2018-01-18 DIAGNOSIS — I63.9 STROKE OF UNKNOWN CAUSE (HCC): Primary | ICD-10-CM

## 2018-01-18 DIAGNOSIS — Z78.9 IMPAIRED MOBILITY AND ADLS: Primary | ICD-10-CM

## 2018-01-18 DIAGNOSIS — M62.81 MUSCLE WEAKNESS: ICD-10-CM

## 2018-01-18 PROCEDURE — 97530 THERAPEUTIC ACTIVITIES: CPT

## 2018-01-18 PROCEDURE — 97110 THERAPEUTIC EXERCISES: CPT | Performed by: PHYSICAL THERAPIST

## 2018-01-18 PROCEDURE — 97110 THERAPEUTIC EXERCISES: CPT

## 2018-01-18 NOTE — THERAPY PROGRESS REPORT/RE-CERT
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       Past Medical History:   Diagnosis Date   • Diabetes mellitus    • Hypertension    • Kidney stone    • Renal disorder         Past Surgical History:   Procedure Laterality Date   • BACK SURGERY     • BRAIN SURGERY         Visit Dx:     ICD-10-CM ICD-9-CM   1. Stroke of unknown cause I63.9 434.91   2. Weakness R53.1 780.79                 PT Ortho       18 1100    Subjective Comments    Subjective Comments Reports doing good, reports tensing up alot due to balance deficits. Denies any falls since start of PT. Denies any PMH or medication changes. Feels stongers   -BB    Precautions and Contraindications    Precautions/Limitations fall precautions  -BB    Precautions Monitor Left Knee (arthritis and gives at times)  -BB    Subjective Pain    Pre-Treatment Pain Level 4   back pain from standing in OT this AM  -BB    Post-Treatment Pain Level 4  -BB    Posture/Observations    Posture/Observations Comments Forward flexed posture, gait with rollator. Requires rest breaks with standing activities. Tolerance time of less than 10 minutes for standing   -BB    Sensory Screen for Light Touch- Lower Quarter Clearing    L2 (anterior mid thigh) Intact  -BB    L3 (distal anterior thigh) Intact  -BB    L4 (medial lower leg/foot) Intact  -BB    L5 (lateral lower leg/great toe) Intact  -BB    ROM (Range of Motion)    General ROM Detail BLE WFL for gait   -BB    MMT (Manual Muscle Testing)    General MMT Assessment Detail bilateral hip grossly 4/5, bilateral knee grossly 4+/5  -BB    Balance Skills Training    SLS Unable without  UE assist   -BB    [place] Sway in all planes   -BB    Sharpened [place] Unsafe without UE assist   -BB    Balance Comments CTSIB composite score 1.80 with min assist to complete foam surface with eyes closed.   -BB    Transfers    Transfer, Comment Independent with UE assist   -BB    Gait Assessment/Treatment    Gait, Comment Gait with rollator.  -BB      User Key  (r) = Recorded By, (t) = Taken By, (c) = Cosigned By    Initials Name Provider Type    XIOMARA Chamberlain PT Physical Therapist                                  PT OP Goals       01/18/18 1100       PT Short Term Goals    STG Date to Achieve 02/08/18  -BB     STG 1 Independent in HEP   -BB     STG 1 Progress Ongoing  -BB     STG 2 Static stand narrow MARC 2' with minimal sway  -BB     STG 2 Progress Progressing  -BB     STG 3 Knee and ankle MMT 5/5  -BB     STG 3 Progress Progressing  -BB     STG 4 Hip MMT 5/5   -BB     STG 4 Progress Progressing  -BB     STG 5 Ambulate community distances with cane safely   -BB     STG 5 Progress Progressing  -BB     Time Calculation    PT Goal Re-Cert Due Date 02/08/18  -BB       User Key  (r) = Recorded By, (t) = Taken By, (c) = Cosigned By    Initials Name Provider Type    XIOMARA Chamberlain PT Physical Therapist                PT Assessment/Plan       01/18/18 1100       PT Assessment    Functional Limitations Impaired gait;Impaired locomotion;Limitations in community activities;Limitations in functional capacity and performance;Decreased safety during functional activities  -BB     Impairments Balance;Gait;Endurance;Impaired muscle endurance;Coordination;Muscle strength;Pain;Posture;Poor body mechanics;Sensation  -BB     Assessment Comments Patient presents with continued decreased strength and funcitonal strength and endurance that limits gait and balance. Patient is able to tolerate less than 10 minutes activity prior to needing to rest. Patient could continue to benefit from skilled PT for gait, balance,  strength, endurance.  -BB     Please refer to paper survey for additional self-reported information No  -BB     Rehab Potential Good  -BB     Patient/caregiver participated in establishment of treatment plan and goals Yes  -BB     Patient would benefit from skilled therapy intervention Yes  -BB     PT Plan    PT Frequency 2x/week  -BB     Predicted Duration of Therapy Intervention (days/wks) 4 weeks  -BB     Planned CPT's? PT RE-EVAL: 28733;PT THER PROC EA 15 MIN: 48129;PT GAIT TRAINING EA 15 MIN: 95731;PT THER SUPP EA 15 MIN;PT THER ACT EA 15 MIN: 24588  -BB     Physical Therapy Interventions (Optional Details) balance training;gait training;gross motor skills;home exercise program;motor coordination training;ROM (Range of Motion);strengthening;stretching;transfer training  -BB     PT Plan Comments Continue to progress balance, strength, endurance. Patients goal is to ambulate with cane. Progress endurane and continue to allow rest breaks   -BB       User Key  (r) = Recorded By, (t) = Taken By, (c) = Cosigned By    Initials Name Provider Type    BB Jacklyn Chamberlain, PT Physical Therapist                  Exercises       01/18/18 1100          Subjective Comments    Subjective Comments Reports doing good, reports tensing up alot due to balance deficits. Denies any falls since start of PT. Denies any PMH or medication changes. Feels stongers   -BB      Subjective Pain    Able to rate subjective pain? yes  -BB      Pre-Treatment Pain Level 4   back pain from standing in OT this AM  -BB      Post-Treatment Pain Level 4  -BB      Exercise 1    Exercise Name 1 pro ll LE strength  -BB      Time (Minutes) 1 10'  -BB      Additional Comments level 3 seat 11  -BB      Exercise 2    Exercise Name 2 recheck  -BB      Exercise 3    Exercise Name 3 CTSIB testing   -BB      Time (Minutes) 3 6'  -BB      Exercise 4    Exercise Name 4 sidestep on foam   -BB      Additional Comments 5 laps with UE assist   -BB      Exercise 5     Exercise Name 5 Tandem steps on foam   -BB      Additional Comments 4 laps with PT UE assist   -BB      Exercise 6    Exercise Name 6 Sitting HS curl green tband   -BB      Sets 6 3  -BB      Reps 6 10  -BB      Additional Comments bilateral   -BB      Exercise 7    Exercise Name 7 Standing hip abd   -BB      Sets 7 2  -BB      Reps 7 10  -BB      Additional Comments bilaterally   -BB      Exercise 8    Exercise Name 8 Standing hip ext   -BB      Sets 8 2  -BB      Reps 8 10  -BB      Additional Comments rest in between sets  -BB      Exercise 9    Exercise Name 9 Standing marching   -BB      Sets 9 2  -BB      Reps 9 10  -BB      Additional Comments rest in between   -BB        User Key  (r) = Recorded By, (t) = Taken By, (c) = Cosigned By    Initials Name Provider Type    BB Jacklyn Chamberlain PT Physical Therapist                                  Time Calculation:   Start Time: 1102  Stop Time: 1156  Time Calculation (min): 54 min  Total Timed Code Minutes- PT: 44 minute(s) (time lost for rest breaks)     Therapy Charges for Today     Code Description Service Date Service Provider Modifiers Qty    79434926717 HC PT THER PROC EA 15 MIN 1/18/2018 Jacklyn Chamberlain PT GP 3    [id number] HC PT THER SUPP EA 15 MIN 1/18/2018 Jacklyn Chamberlain PT GP 1                    Jacklyn Chamberlain PT  1/18/2018

## 2018-01-18 NOTE — THERAPY TREATMENT NOTE
Outpatient Occupational Therapy Rehab Program Treatment  [facility]     Patient Name: Bakari Dover  : 1955  MRN: [id number]  Today's Date: 2018        Visit Date: 2018    There is no problem list on file for this patient.  Visit Number: 10/10  % Improvement: 60%  Recert Date: 2018  MD visit: 2018       Past Medical History:   Diagnosis Date   • Diabetes mellitus    • Hypertension    • Kidney stone    • Renal disorder         Past Surgical History:   Procedure Laterality Date   • BACK SURGERY     • BRAIN SURGERY           Visit Dx:    ICD-10-CM ICD-9-CM   1. Impaired mobility and ADLs Z74.09 799.89   2. Impaired instrumental activities of daily living (IADL) R53.81 799.3   3. Muscle weakness M62.81 728.87               OT Neuro       18 1015          Subjective Comments    Subjective Comments Pt reports compliance with HEP and has an MD appointment set for . Pt reports he has not been able to get out much because of the poor weather conditions.   -MR      Precautions and Contraindications    Precautions/Limitations fall precautions  -MR      Subjective Pain    Able to rate subjective pain? yes  -MR      Pre-Treatment Pain Level 0  -MR      Post-Treatment Pain Level 2  -MR      Subjective Pain Comment Lower back  -MR      Pain Assessment    Pain Assessment 0-10  -MR      Pain Score 0  -MR      Post Pain Score 2  -MR      Pain Type Chronic pain  -MR      Pain Location Back  -MR      Pain Orientation Lower  -MR      Pain Intervention(s) Rest;Distraction;Repositioned  -MR      Cognitive Assessment/Intervention    Current Cognitive/Communication Assessment functional  -MR      Orientation Status oriented x 4  -MR      Follows Commands/Answers Questions 100% of the time;able to follow single-step instructions;needs cueing  -MR      Personal Safety mild impairment  -MR      Personal Safety Interventions fall prevention program maintained;gait belt;muscle strengthening  facilitated;nonskid shoes/slippers when out of bed;supervised activity  -MR      Cognition Comments Pt completed functional cooking task this date and demonstrated mild difficulty with sequencing and paying attention to task, he required min VC for thoroughness and for keeping work area clean  -MR      Sensation    Additional Comments Recommended pt look into purchasing an ove glove to use when cooking/around hot surfaces (stove top, oven, grill). Pt was agreeable and understand the safety concerns this product may help with. Educated and engaged in task to increase awareness to lack of sensation and over coming impaired sensation through increased awareness to where hands are in space and focusing on surroundings.   -MR      Posture/Observations    Posture/Observations Comments Pt had mod difficulty standing upright this date, he required VC and TC to maintain upright standing posture, pt preferred to lean forward with head over toes, educated on the importance of good/appropriate posture.   -MR      Gross Motor Training    Gross Motor Skill, Impairments Detail Pt completed a functional cooking task this date, 3 -step baking task incorporating functional standing balance 2 x 7 minutes with CGA during min distance functional ambulation with no AD less than 3 feet, B integration was fair +, demonstrating difficulty with scooping batter into pan, he had moderate spillage and displayed difficulty gaging appropriate about of mixture, pt was able to successfully follow three step instructions with min VC. Pt demonstarted good safety awareness when using toaster oven, he displayed moderate difficulty when using oven mitt to get pan out of toaster oven resulting in concern for using an oven mitt at home on a larger scale. Educated pt on looking into buying an ove glove d/t its gripping surface and aloows all digits to move seperatly. Pt completed B integration cooridination task with 4# weighted ball while standing with  CGA, pt required to locate targets on wall and tap target with weighted ball 2 x 15 reps at various heights including above shoulder height and below knee level, pt required a seated recovery period between sets; task also required pt to increase awareness of surrounds and scan his environment, he required increased time to complete task. FMC: pt completed palm to finger translation while holding multiple peices, object manipulation, and complex rotation. He has 2 drops during task resulting in ~ 90% FMC accuracy during task.   -MR      Transfers    Transfers, Sit-Stand [place] conditional independence  -MR      Transfers, Stand-Sit [place] conditional independence  -MR      Transfers, Sit-Stand-Sit, Assist Device --   rollator  -MR      Functional Mobility    Functional Mobility- Ind. Level conditional independence  -MR      Functional Mobility- Device rollator  -MR      Functional Mobility- Comment Increased safety noted this date with rollator, pt also responded apprpriatly to situational scenarios including how would he safely transports goods from one area to another within his home.   -MR      ADL Assessment/Intervention    IADL Assess/Train, Comment Please refer to GM section  -MR      Balance Skills Training    Sitting-Level of Assistance Independent  -MR      Standing-Level of Assistance Contact guard   when balance is challenged  -MR      Gait Balance-Level of Assistance Independent  -MR        User Key  (r) = Recorded By, (t) = Taken By, (c) = Cosigned By    Initials Name Provider Type    MR Michelle Santana OT Occupational Therapist           Hand Therapy (last 24 hours)      Hand Eval       01/18/18 1015          Therapy Education    Education Details Pt educated on increasing safety awareness at home with cooking task and activities that could be potentially dangerous with impaired B hand sensation, educated pt on taking a look into pruchasing ove gloves to use when cooking to limit the  possibilities of burning hands during cooking task. Educated pt on the impoartance of HEP and continuing with current HEP. Educated pt on increasing awareness and provided a sheet of aspects to be aware of in the home when you have impaired B sensation.   -MR      Given HEP;Symptoms/condition management;Posture/body mechanics;Fall prevention and home safety  -MR      Program Progressed  -MR      How Provided Verbal;Demonstration;Written  -MR      Provided to Patient  -MR      Level of Understanding Verbalized;Demonstrated  -MR        User Key  (r) = Recorded By, (t) = Taken By, (c) = Cosigned By    Initials Name Provider Type     Michelle KUMAR Max, OT Occupational Therapist                Therapy Education  Education Details: Pt educated on increasing safety awareness at home with cooking task and activities that could be potentially dangerous with impaired B hand sensation, educated pt on taking a look into pruchasing ove gloves to use when cooking to limit the possibilities of burning hands during cooking task. Educated pt on the impoartance of HEP and continuing with current HEP. Educated pt on increasing awareness and provided a sheet of aspects to be aware of in the home when you have impaired B sensation.   Given: HEP, Symptoms/condition management, Posture/body mechanics, Fall prevention and home safety  Program: Progressed  How Provided: Verbal, Demonstration, Written  Provided to: Patient  Level of Understanding: Verbalized, Demonstrated          OT Assessment/Plan       01/18/18 1100 01/18/18 1015    OT Assessment    Assessment Comments  Pt demonstrated increased fatigue with functional standing balance tasks this date requiring seated recovery periods and displayed min difficulty with functional cooking task including sequencing and spatial awareness. Pt would continue to benefit from skilled OT services d/t decreased strength, activity tolerance, balance, endurance, independence with ADL's/IADL's,  knowledge/safety awareness, and FMC/GMC skills. Recommend cont POC.   -MR    OT Plan    OT Frequency  2x/week  -MR    Predicted Duration of Therapy Intervention (days/wks) 4 weeks  -BB       User Key  (r) = Recorded By, (t) = Taken By, (c) = Cosigned By    Initials Name Provider Type    XIOMARA Chamberlain, PT Physical Therapist    MR Michelle Santaan, OT Occupational Therapist                 OT Goals       01/18/18 1015       OT Short Term Goals    STG Date to Achieve --   by 4 weeks  -MR     STG 1 Pt will complete FM activities with 75% accuracy on at least 2/3 sessions.   -MR     STG 1 Progress Progressing  -MR     STG 1 Progress Comments Pt demonstrated ~ 50 accuracy with cooking task and 90% accuracy with FM focused table top task.   -MR     STG 2 Pt will complete BUE AROM exercises in all planes to increase strength to 5/5 to increase functional independence with ADL's and IADL's.  -MR     STG 2 Progress Progressing  -MR     STG 3 Pt will tolerate 5-7 mins of functional standing activity with no lose of balance or increased pain on 2/3 sessions.   -MR     STG 3 Progress Met;Ongoing  -MR     STG 4 Pt will complete BUE coordination task with 75% accuracy on at least 2/3 sessions.   -MR     STG 4 Progress Partially Met;Ongoing  -MR     Long Term Goals    LTG Date to Achieve --   by d/c  -MR     LTG 1 Pt will be independent with progressing HEP on at least 3/3 sessions.  -MR     LTG 1 Progress Met;Ongoing  -MR     LTG 2 Pt will be able to simulate and/or report conditional independence with efficient time frame with all ADL's on at least 2/3 sessions.   -MR     LTG 2 Progress Progressing  -MR     LTG 3 Pt will be able to simulate and/or report conditional independence with efficient time frame with IADL skills including light house keeping tasks, meal preparation, and general home management skills on at least 2/3 sessions.  -MR     LTG 3 Progress Progressing  -MR     LTG 4 Pt will tolerate 45 minutes treatment  session with no fatigue/rest breaks required on at least 2/3 session.   -MR     LTG 4 Progress Progressing  -MR     LTG 5 Pt will demonstrate/report increased spatial awareness and safety awareness during cooking task/functional activity on 2/3 sessions.   -MR     LTG 5 Progress Progressing  -MR     LTG 6 Pt will engage in BUE ROM exercises/streches with 75% improvement of function on 2/3 sessions.   -MR     LTG 6 Progress Progressing  -MR     Time Calculation    OT Goal Re-Cert Due Date 02/08/18  -MR       User Key  (r) = Recorded By, (t) = Taken By, (c) = Cosigned By    Initials Name Provider Type     Michelle Santana OT Occupational Therapist                              Time Calculation:   OT Start Time: 1015  OT Stop Time: 1100  OT Time Calculation (min): 45 min  Total Timed Code Minutes- OT: 45 minute(s)     Therapy Charges for Today     Code Description Service Date Service Provider Modifiers Qty    45792458914  OT THERAPEUTIC ACT EA 15 MIN 1/18/2018 Michelle Santana OT GO 2    [id number]  OT THER PROC EA 15 MIN 1/18/2018 Michelle Santana OT GO 1                    Michelle Santana OT  1/18/2018

## 2018-01-22 ENCOUNTER — APPOINTMENT (OUTPATIENT)
Dept: PHYSICAL THERAPY | Facility: HOSPITAL | Age: 63
End: 2018-01-22

## 2018-01-22 ENCOUNTER — APPOINTMENT (OUTPATIENT)
Dept: OCCUPATIONAL THERAPY | Facility: HOSPITAL | Age: 63
End: 2018-01-22

## 2018-01-25 ENCOUNTER — HOSPITAL ENCOUNTER (OUTPATIENT)
Dept: OCCUPATIONAL THERAPY | Facility: HOSPITAL | Age: 63
Setting detail: THERAPIES SERIES
Discharge: HOME | End: 2018-01-25

## 2018-01-25 ENCOUNTER — HOSPITAL ENCOUNTER (OUTPATIENT)
Dept: PHYSICAL THERAPY | Facility: HOSPITAL | Age: 63
Setting detail: THERAPIES SERIES
Discharge: HOME | End: 2018-01-25

## 2018-01-25 DIAGNOSIS — I63.9 STROKE OF UNKNOWN CAUSE (HCC): Primary | ICD-10-CM

## 2018-01-25 DIAGNOSIS — M62.81 MUSCLE WEAKNESS: ICD-10-CM

## 2018-01-25 DIAGNOSIS — Z74.09 IMPAIRED MOBILITY AND ADLS: Primary | ICD-10-CM

## 2018-01-25 DIAGNOSIS — R53.1 WEAKNESS: ICD-10-CM

## 2018-01-25 DIAGNOSIS — Z78.9 IMPAIRED INSTRUMENTAL ACTIVITIES OF DAILY LIVING (IADL): ICD-10-CM

## 2018-01-25 DIAGNOSIS — Z78.9 IMPAIRED MOBILITY AND ADLS: Primary | ICD-10-CM

## 2018-01-25 PROCEDURE — 97530 THERAPEUTIC ACTIVITIES: CPT

## 2018-01-25 PROCEDURE — 97110 THERAPEUTIC EXERCISES: CPT

## 2018-01-25 NOTE — THERAPY TREATMENT NOTE
Outpatient Occupational Therapy Rehab Program Treatment  [facility]     Patient Name: Bakari Dover  : 1955  MRN: [id number]  Today's Date: 2018        Visit Date: 2018    There is no problem list on file for this patient.  Visit Number:   % Improvement:60%  Recert Date: 2018  MD visit: N/A    Insurance Visits Approved: 22 visits       Past Medical History:   Diagnosis Date   • Diabetes mellitus    • Hypertension    • Kidney stone    • Renal disorder         Past Surgical History:   Procedure Laterality Date   • BACK SURGERY     • BRAIN SURGERY           Visit Dx:    ICD-10-CM ICD-9-CM   1. Impaired mobility and ADLs Z74.09 799.89   2. Impaired instrumental activities of daily living (IADL) R53.81 799.3   3. Muscle weakness M62.81 728.87               OT Neuro       18 0900          Subjective Comments    Subjective Comments Pt has neurologist appointment on 2018, per pt the report was good no concerns were noted. Pt reports a 60% improvement since beginning services. Pt reports he is getting stronger, major concern is functional mobility and getting up from his low setting rocking recliner.   -MR      Precautions and Contraindications    Precautions/Limitations fall precautions  -MR      Subjective Pain    Able to rate subjective pain? yes  -MR      Pre-Treatment Pain Level 0  -MR      Post-Treatment Pain Level 6  -MR      Subjective Pain Comment LOWER BACK  -MR      Cognitive Assessment/Intervention    Current Cognitive/Communication Assessment functional  -MR      Orientation Status oriented x 4  -MR      Follows Commands/Answers Questions 100% of the time;able to follow multi-step instructions  -MR      Personal Safety mild impairment  -MR      Personal Safety Interventions fall prevention program maintained;gait belt;muscle strengthening facilitated;nonskid shoes/slippers when out of bed;supervised activity  -MR      Sensation    Additional Comments Continued  education on safety in the kitchen and reviewed the precautions from last session, pt able to recall each safety tip.   -MR      Posture/Observations    Alignment Options Rounded shoulders  -MR      Rounded Shoulders Bilateral:;Mild  -MR      Posture/Observations Comments Pt required VC to stand upright during functional AROM exercises and activities, he was able to correct with min VC this date. Pt noted to lean forward during functional dynamic standing balance tasks.   -MR      Coordination    Coordination Tests Bilateral integration;Dexterity  -MR      Bilateral Integration Right:;Left:;Intact  -MR      Dexterity Right:;Left:;Intact  -MR      Gross Motor Training    Gross Motor Skill, Impairments Detail Pt completed BUE exercises on [employer] machine, 2 x 15 reps with 20#, pt completed BUE chest press, shoulder flexion/extension, and L/R one arm shoulder ab/ad push and pull exercise while standing with min VC for technique, safety, and appropriate standing posture with CGA. Pt demonstrated fair - balance when initating exercises. Pt completed functional standing balance activities while standing with no AD, pt stood for 2 x 2 minutes each to completed B integration task requiring pt to tap a large lightweight ball on wall above head height, pt required CGA, he reported increase in back pain 6/10 discontinued task. Pt required multiple seated recovery periods this date during BUE exercises and between each standing balance activity.  FMC: pt completed table top nuts and bolts task. Pt was required to remove and replace various sized nuts on various types of threaded bolts, pt demonstrated no difficulty removing nuts, with min difficulty replacing d/t having to attend to detail and receive input from the bolts. Pt required min VC to attend to detail, pt required increased time to complete the task.   -MR      Transfers    Transfers, Sit-Stand [place] stand by assist;contact guard assist   SBA/CGA d/t pt level of  fatigue from having PT session prior  -MR      Transfers, Stand-Sit [place] supervision required;stand by assist;contact guard assist  -MR      Transfers, Sit-Stand-Sit, Assist Device --   rollator  -MR      Transfer, Comment Pt displayed good safety awareness and locked his brakes prior to each t/f this date.   -MR      Functional Mobility    Functional Mobility- Ind. Level conditional independence  -MR      Functional Mobility- Device rollator  -MR      Functional Mobility- Comment Good safety awareness noted, increased fatigue noted and he required increased time to complete functional mobility between task  -MR      Balance Skills Training    Sitting-Level of Assistance Independent  -MR      Standing-Level of Assistance Contact guard   during functional standing balance tasks  -MR      Gait Balance-Level of Assistance Distant supervision;Independent   d/t increased fatigue  -MR        User Key  (r) = Recorded By, (t) = Taken By, (c) = Cosigned By    Initials Name Provider Type     Michelle KUMAR Max, OT Occupational Therapist                  Therapy Education  Education Details: Pt educated on benefit of continuing HEP, benefit of activity, and safety awareness during cooking tasks.   Given: HEP, Fall prevention and home safety  Program: Reinforced  How Provided: Verbal, Demonstration  Provided to: Patient  Level of Understanding: Verbalized, Demonstrated          OT Assessment/Plan       01/25/18 0900 01/25/18 0800    OT Assessment    Assessment Comments Pt demonstrated increased fatigue this date, however tolerated session well. Pt completed BUE AROM exercises while in standing with CGA d/t unsteady t/f, increased fatigue was noted, pt required seated rest breaks between each set of exercises. Pt would cont to benefit from skilled OT services to address decreased strength, balance, activity tolerance, endurance, safety awareness, FM/GM integration and coordination, and independencew ith ADL's/IADL's.    -MR     OT Plan    OT Frequency 2x/week  -MR     Predicted Duration of Therapy Intervention (days/wks)  4 weeks  -      User Key  (r) = Recorded By, (t) = Taken By, (c) = Cosigned By    Initials Name Provider Type    MR Michelle Santana, OT Occupational Therapist    WC Bakari Doshi, PT Physical Therapist                 OT Goals       01/25/18 0900       OT Short Term Goals    STG Date to Achieve --   by 4 weeks  -MR     STG 1 Pt will complete FM activities with 75% accuracy on at least 2/3 sessions.   -MR     STG 1 Progress Progressing  -MR     STG 1 Progress Comments Pt required min VC to complete task appropriately, demonstrated ~ 75% accuracy  -MR     STG 2 Pt will complete BUE AROM exercises in all planes to increase strength to 5/5 to increase functional independence with ADL's and IADL's.  -MR     STG 2 Progress Progressing  -MR     STG 2 Progress Comments Strength is improving  -MR     STG 3 Pt will tolerate 5-7 mins of functional standing activity with no lose of balance or increased pain on 2/3 sessions.   -MR     STG 3 Progress Met;Ongoing  -MR     STG 4 Pt will complete BUE coordination task with 75% accuracy on at least 2/3 sessions.   -MR     STG 4 Progress Ongoing;Met  -MR     STG 4 Progress Comments Pt completed BUE coordination task with 100% accuracy this date and no drops during task  -MR     Long Term Goals    LTG Date to Achieve --   by d/c  -MR     LTG 1 Pt will be independent with progressing HEP on at least 3/3 sessions.  -MR     LTG 1 Progress Met;Ongoing  -MR     LTG 2 Pt will be able to simulate and/or report conditional independence with efficient time frame with all ADL's on at least 2/3 sessions.   -MR     LTG 2 Progress Partially Met;Ongoing  -MR     LTG 3 Pt will be able to simulate and/or report conditional independence with efficient time frame with IADL skills including light house keeping tasks, meal preparation, and general home management skills on at least 2/3 sessions.  -MR      LTG 3 Progress Progressing  -MR     LTG 4 Pt will tolerate 45 minutes treatment session with no fatigue/rest breaks required on at least 2/3 session.   -MR     LTG 4 Progress Progressing  -MR     LTG 5 Pt will demonstrate/report increased spatial awareness and safety awareness during cooking task/functional activity on 2/3 sessions.   -MR     LTG 5 Progress Progressing  -MR     LTG 6 Pt will engage in BUE ROM exercises/streches with 75% improvement of function on 2/3 sessions.   -MR     LTG 6 Progress Progressing  -MR     Time Calculation    OT Goal Re-Cert Due Date 02/08/18  -MR       User Key  (r) = Recorded By, (t) = Taken By, (c) = Cosigned By    Initials Name Provider Type    MR Michelle Santana OT Occupational Therapist                              Time Calculation:   OT Start Time: 0900  OT Stop Time: 0947  OT Time Calculation (min): 47 min  Total Timed Code Minutes- OT: 47 minute(s)     Therapy Charges for Today     Code Description Service Date Service Provider Modifiers Qty    [id number]  OT THER PROC EA 15 MIN 1/25/2018 Michelle Santana OT GO 2    [id number]  OT THERAPEUTIC ACT EA 15 MIN 1/25/2018 Michelle Santana OT GO 1                    Michelle Santana OT  1/25/2018

## 2018-01-25 NOTE — THERAPY TREATMENT NOTE
Outpatient Physical Therapy Ortho Treatment Note  [facility]     Patient Name: Bakari Dover  : 1955  MRN: [id number]  Today's Date: 2018      Visit Date: 2018    Insurance  [place] [employer]   Visit #    Next MD Visit TBD   Recert Date 18           Visit Dx:    ICD-10-CM ICD-9-CM   1. Stroke of unknown cause I63.9 434.91   2. Weakness R53.1 780.79       There is no problem list on file for this patient.       Past Medical History:   Diagnosis Date   • Diabetes mellitus    • Hypertension    • Kidney stone    • Renal disorder         Past Surgical History:   Procedure Laterality Date   • BACK SURGERY     • BRAIN SURGERY               PT Ortho       18 08    Precautions and Contraindications    Precautions/Limitations fall precautions  -    Precautions Monitor L knee (arhtritis and gives at times)  -    Subjective Pain    Able to rate subjective pain? yes  -    Pre-Treatment Pain Level 0  -      User Key  (r) = Recorded By, (t) = Taken By, (c) = Cosigned By    Initials Name Provider Type    YAN Doshi, PT Physical Therapist                            PT Assessment/Plan       18 08       PT Assessment    Assessment Comments Patient responded well to more difficult static balance activities today, with good endurance throughout session  -     PT Plan    PT Frequency 2x/week  -     Predicted Duration of Therapy Intervention (days/wks) 4 weeks  -     PT Plan Comments Continue to progress B LE strength, static and dynamic balance activities, and gait training with SC as tolerable.   -       User Key  (r) = Recorded By, (t) = Taken By, (c) = Cosigned By    Initials Name Provider Type    YAN Doshi, PT Physical Therapist                    Exercises       18 08          Subjective Comments    Subjective Comments Patient reports he is doing well, with no recent falls. Patient with no new complaints. Patient wants to be able to ambulate  with SC in community  -      Subjective Pain    Able to rate subjective pain? yes  -WC      Pre-Treatment Pain Level 0  -WC      Post-Treatment Pain Level 0  -WC      Exercise 1    Exercise Name 1 Pro II LE strength  -WC      Time (Minutes) 1 10'  -WC      Additional Comments L3  -WC      Exercise 2    Exercise Name 2 Lateral/retrograde walking at parallel bars  -WC      Time (Seconds) 2 3 min  -WC      Exercise 3    Exercise Name 3 sit to stand from mat  -WC      Sets 3 2  -WC      Reps 3 10  -WC      Exercise 4    Exercise Name 4 Narrow stance EO no UE support x 1 min  -WC      Additional Comments SBA, stable surface  -WC      Exercise 5    Exercise Name 5 Static tandem stance EO 2 finger support  -WC      Time (Minutes) 5 1 min each way  -WC      Additional Comments CGA, stable surface  -WC      Exercise 6    Exercise Name 6 Narrow stance EO no UE support  -WC      Time (Minutes) 6 1 min  -WC      Additional Comments CGA, Airex  -WC      Exercise 7    Exercise Name 7 Narrow stance EC no UE support  -WC      Time (Minutes) 7 1 min  -WC      Additional Comments CGA, stable surface  -WC      Exercise 8    Exercise Name 8 Standing toe tap 2 UE support  -WC      Sets 8 2  -WC      Reps 8 20  -WC      Additional Comments 5' step  -WC      Exercise 9    Exercise Name 9 B SLS 1 UR support  -WC      Time (Minutes) 9 1 min each  -WC      Additional Comments CGA  -WC        User Key  (r) = Recorded By, (t) = Taken By, (c) = Cosigned By    Initials Name Provider Type    YAN Doshi, PT Physical Therapist                               PT OP Goals       01/25/18 0800       PT Short Term Goals    STG Date to Achieve 02/08/18  -WC     STG 1 Independent in HEP   -WC     STG 1 Progress Ongoing  -WC     STG 2 Static stand narrow MARC 2' with minimal sway  -WC     STG 2 Progress Progressing  -WC     STG 3 Knee and ankle MMT 5/5  -WC     STG 3 Progress Progressing  -WC     STG 4 Hip MMT 5/5   -WC     STG 4 Progress Progressing   -     STG 5 Ambulate community distances with cane safely   -     STG 5 Progress Progressing  -       User Key  (r) = Recorded By, (t) = Taken By, (c) = Cosigned By    Initials Name Provider Type    YAN Doshi PT Physical Therapist          Therapy Education  Education Details: Patient educated on continued completion of HEP at home.  Given: HEP  Program: Reinforced  How Provided: Verbal  Provided to: Patient  Level of Understanding: Verbalized              Time Calculation:   Start Time: 0804  Stop Time: 0855  Time Calculation (min): 51 min  Total Timed Code Minutes- PT: 51 minute(s)    Therapy Charges for Today     Code Description Service Date Service Provider Modifiers Qty    59304142416  PT THERAPEUTIC ACT EA 15 MIN 1/25/2018 Bakari Doshi PT GP 2    [id number]  PT THER PROC EA 15 MIN 1/25/2018 Bakari Doshi PT GP 1                    Bakari Doshi PT  1/25/2018

## 2018-01-29 ENCOUNTER — HOSPITAL ENCOUNTER (OUTPATIENT)
Dept: OCCUPATIONAL THERAPY | Facility: HOSPITAL | Age: 63
Setting detail: THERAPIES SERIES
Discharge: HOME | End: 2018-01-29

## 2018-01-29 ENCOUNTER — HOSPITAL ENCOUNTER (OUTPATIENT)
Dept: PHYSICAL THERAPY | Facility: HOSPITAL | Age: 63
Setting detail: THERAPIES SERIES
Discharge: HOME | End: 2018-01-29

## 2018-01-29 DIAGNOSIS — Z74.09 IMPAIRED MOBILITY AND ADLS: Primary | ICD-10-CM

## 2018-01-29 DIAGNOSIS — Z78.9 IMPAIRED MOBILITY AND ADLS: Primary | ICD-10-CM

## 2018-01-29 DIAGNOSIS — M62.81 MUSCLE WEAKNESS: ICD-10-CM

## 2018-01-29 DIAGNOSIS — Z78.9 IMPAIRED INSTRUMENTAL ACTIVITIES OF DAILY LIVING (IADL): ICD-10-CM

## 2018-01-29 DIAGNOSIS — I63.9 STROKE OF UNKNOWN CAUSE (HCC): Primary | ICD-10-CM

## 2018-01-29 DIAGNOSIS — R53.1 WEAKNESS: ICD-10-CM

## 2018-01-29 PROCEDURE — 97530 THERAPEUTIC ACTIVITIES: CPT

## 2018-01-29 PROCEDURE — 97110 THERAPEUTIC EXERCISES: CPT

## 2018-01-29 NOTE — THERAPY TREATMENT NOTE
Outpatient Occupational Therapy Rehab Program Treatment  [facility]     Patient Name: Bakari Dover  : 1955  MRN: [id number]  Today's Date: 2018        Visit Date: 2018    There is no problem list on file for this patient.  Visit Number:  Recert Date: 2018 % Improvement: 60%        MD Visit Date: Thursday    Total Insurance visit approved: 22          Past Medical History:   Diagnosis Date   • Diabetes mellitus    • Hypertension    • Kidney stone    • Renal disorder         Past Surgical History:   Procedure Laterality Date   • BACK SURGERY     • BRAIN SURGERY           Visit Dx:    ICD-10-CM ICD-9-CM   1. Impaired mobility and ADLs Z74.09 799.89   2. Impaired instrumental activities of daily living (IADL) R53.81 799.3   3. Muscle weakness M62.81 728.87               OT Neuro       18 0850          Subjective Comments    Subjective Comments Pt here after PT session reporting pain in R knee and bilateral shoulders. Pt sees Neurologist on 2018 for follow up on blood work.  Pt states he had a set up back over the weekend with his R knee wanting to give out and causing him to feel like he isn't doing as well. Pt was educated to ice and heat R knee to see if any he benefited and to decrease overall pain.  -BL      Precautions and Contraindications    Precautions/Limitations fall precautions  -BL      Subjective Pain    Able to rate subjective pain? yes  -BL      Pre-Treatment Pain Level 7  -BL      Post-Treatment Pain Level 7  -BL      Subjective Pain Comment R knee, B shoulders and lower back  -BL      Cognitive Assessment/Intervention    Current Cognitive/Communication Assessment functional  -BL      Orientation Status oriented x 4  -BL      Follows Commands/Answers Questions 100% of the time  -BL      Personal Safety mild impairment  -BL      Personal Safety Interventions fall prevention program maintained;gait belt;muscle strengthening facilitated;nonskid shoes/slippers  when out of bed  -BL      Sensation    Additional Comments Pt states he has yet to purchase his oven glove to increase his safety with his kitchen task.   -BL      Posture/Observations    Alignment Options Rounded shoulders  -BL      Rounded Shoulders Bilateral:;Mild  -BL      Posture/Observations Comments Pt able to demonstrate fair to good posture this date with verbal cues required to sit up on edge of mat.   -BL      Coordination    Other Coordination Observations Pt participated in BUE integration task in standing this date for dyanmic standing balance activity as well x ~3 minutes. Pt was noted to have one LOB in standing while completing balloon juggling task with decreased endurance noted along with fatigue and decreased tolerance in LUE this date. Pt displayed decreased AROM with LUE during sitting rapid alternating task with balloon for BUE endurance requiring rest breaks however did well with RUE this date. Pt completed FMC task in BUE with in hand maniuplation task of placing cap on pen/highlighter x 2 in both hands increased time/effort only this date; min difficulty. Pt was able to demonstrate fair dexterity with marbles in hand for in hand manipulation task and fine motor manipulation with moderate difficulty and increased time for 10 reps of rotation in palms. Pt completed  strengthening task with hand gripper x 20 reps bilaterally with minimal difficulty in L hand however moderate difficulty in R hand this date with pt stating mild pain 2/10 in R wrist.   -BL      Coordination Tests Dexterity;Bilateral integration;Praxis/Motor planning;Rapid Alternating  -BL      ROM (Range of Motion)    General ROM Detail LUE was noted to have decreased AROM in LUE per pt report due to old shoulder rotator cuff injury   -BL      Functional Mobility    Functional Mobility- Ind. Level conditional independence  -BL      Functional Mobility- Device rollator  -BL      Functional Mobility- Comment Pt demonstrated  increased safety awareness this date with rollator.  -      ADL Assessment/Intervention    IADL Assess/Train, Comment Pt states he has been using sock aid and shower chair along with reacher for dressing.  -        User Key  (r) = Recorded By, (t) = Taken By, (c) = Cosigned By    Initials Name Provider Type    SHERIDAN Canales Occupational Therapy Assistant                  Therapy Education  Education Details: Pt educated on FMC task to complete at home along with EC/WS however pt displayed increased safety awareness this date and EC techniques were utilized throughout treatment.  Given: HEP, Fall prevention and home safety  Program: Reinforced  How Provided: Verbal, Demonstration  Provided to: Patient  Level of Understanding: Verbalized, Demonstrated          OT Assessment/Plan       01/29/18 0850 01/29/18 0800    OT Assessment    Assessment Comments Pt participated and demonstrated fair endurance this date after PT session. Pt demonstrated fair balance with one minor LOB with recovery by patient; no assistance required. Pt completed GMC task, UE exercises in standing/sitting, and endurance with rest breaks as needed around 2-3. Pt was able to meet G   -     OT Plan    Predicted Duration of Therapy Intervention (days/wks)  4 weeks  -      User Key  (r) = Recorded By, (t) = Taken By, (c) = Cosigned By    Initials Name Provider Type    SHERIDAN Canales Occupational Therapy Assistant     Mara Head PTA Physical Therapy Assistant                 OT Goals       01/29/18 0853       OT Short Term Goals    STG Date to Achieve --   by 4 weeks  -     STG 1 Pt will complete FM activities with 75% accuracy on at least 2/3 sessions.   -     STG 1 Progress Progressing  -     STG 2 Pt will complete BUE AROM exercises in all planes to increase strength to 5/5 to increase functional independence with ADL's and IADL's.  -BL     STG 2 Progress Progressing  -     STG 3 Pt will tolerate 5-7  mins of functional standing activity with no lose of balance or increased pain on 2/3 sessions.   -BL     STG 3 Progress Met;Ongoing  -BL     STG 4 Pt will complete BUE coordination task with 75% accuracy on at least 2/3 sessions.   -BL     STG 4 Progress Met;Ongoing  -BL     Long Term Goals    LTG Date to Achieve --   by d/c  -BL     LTG 1 Pt will be independent with progressing HEP on at least 3/3 sessions.  -BL     LTG 1 Progress Met;Ongoing  -BL     LTG 2 Pt will be able to simulate and/or report conditional independence with efficient time frame with all ADL's on at least 2/3 sessions.   -BL     LTG 2 Progress Partially Met;Ongoing  -BL     LTG 2 Progress Comments per pt report this  -BL     LTG 3 Pt will be able to simulate and/or report conditional independence with efficient time frame with IADL skills including light house keeping tasks, meal preparation, and general home management skills on at least 2/3 sessions.  -BL     LTG 3 Progress Partially Met  -BL     LTG 3 Progress Comments --  -BL     LTG 4 Pt will tolerate 45 minute treatment with no fatigue/rest breaks required on at least 2/3sessions.  -BL     LTG 4 Progress Progressing  -BL     LTG 5 Pt will demonstrate/report increased spatial awareness and safety awareness during cooking task/functional activity on 2/3 sessions.   -     LTG 5 Progress Progressing  -BL     LTG 6 Pt will engage in BUE ROM exercises/streches with 75% improvement of function on 2/3 sessions.   -     LTG 6 Progress Progressing  -BL     Time Calculation    OT Goal Re-Cert Due Date 02/28/18  -       User Key  (r) = Recorded By, (t) = Taken By, (c) = Cosigned By    Initials Name Provider Type     SHERIDAN Galicia Occupational Therapy Assistant                    OT Exercises       01/29/18 0850          Exercise 1    Exercise Name 1 Sit to stand x 3 with moderate difficulty.  -      Intensity 1 Moderate  -BL      Exercise 2    Exercise Name 2 Body Blade x1 minute   -BL      Cueing 2 Verbal;Demo  -BL      Intensity 2 Mild  -BL      Exercise 3    Exercise Name 3 4# Dumbell tricep exercises in sitting  -BL      Cueing 3 Verbal;Demo  -BL      Equipment 3 Dumbell  -BL      Weights/Plates 3 4  -BL      Sets 3 2  -BL      Reps 3 10  -BL      Intensity 3 Moderate  -BL        User Key  (r) = Recorded By, (t) = Taken By, (c) = Cosigned By    Initials Name Provider Type     ISMAEL Galicia/LOS Occupational Therapy Assistant                      Time Calculation:   OT Start Time: 0850  OT Stop Time: 0931  OT Time Calculation (min): 41 min  Total Timed Code Minutes- OT: 40 minute(s)     Therapy Charges for Today     Code Description Service Date Service Provider Modifiers Qty    51794452589  OT THER PROC EA 15 MIN 1/29/2018 ISMAEL Galicia/L GO 2    [id number]  OT THERAPEUTIC ACT EA 15 MIN 1/29/2018 ISMAEL Galicia/LOS GO 1                    SHERIDAN Galicia  1/29/2018

## 2018-01-29 NOTE — THERAPY TREATMENT NOTE
Outpatient Physical Therapy Ortho Treatment Note  [facility],      Patient Name: Bakari Dover  : 1955  MRN: [id number]  Today's Date: 2018      Visit Date: 2018  Pt reports 7/10 pain pre treatment, 7/10 pain post treatment  Reports 60% of improvement.  Attended 8/8 visits.  Insurance available: 22 visits  Next MD appt:LINH .  Recertification: 2018.  Visit Dx:    ICD-10-CM ICD-9-CM   1. Stroke of unknown cause I63.9 434.91   2. Weakness R53.1 780.79       There is no problem list on file for this patient.       Past Medical History:   Diagnosis Date   • Diabetes mellitus    • Hypertension    • Kidney stone    • Renal disorder         Past Surgical History:   Procedure Laterality Date   • BACK SURGERY     • BRAIN SURGERY               PT Ortho       18 0800    Precautions and Contraindications    Precautions/Limitations fall precautions  -TL    Precautions Monitor L knee (arhtritis and gives at times)  -TL    Subjective Pain    Able to rate subjective pain? yes  -TL    Pre-Treatment Pain Level 7   left knee hurts with gait.  -TL    Post-Treatment Pain Level 7  -TL    Posture/Observations    Posture/Observations Comments forward flexed with alot of weigth bearing through UE.  -TL      User Key  (r) = Recorded By, (t) = Taken By, (c) = Cosigned By    Initials Name Provider Type    TL Mara Head, PTA Physical Therapy Assistant                            PT Assessment/Plan       18 0800       PT Assessment    Assessment Comments pt still weak to the right LE. pt decrease balance with single UE support. pt needs cues on upright posturing. Working toward goals. No new goals met at this time.   -TL     PT Plan    PT Frequency 2x/week  -TL     Predicted Duration of Therapy Intervention (days/wks) 4 weeks  -TL     PT Plan Comments pt needs strengthening LE and continues with balance with single UE support.  -TL       User Key  (r) = Recorded By, (t) = Taken By, (c) =  Cosigned By    Initials Name Provider Type    TL Mara Head PTA Physical Therapy Assistant                    Exercises       01/29/18 0800          Subjective Pain    Able to rate subjective pain? yes  -TL      Pre-Treatment Pain Level 7   left knee hurts with gait.  -TL      Post-Treatment Pain Level 7  -TL      Exercise 1    Exercise Name 1 Pro II LE strength  -TL      Time (Minutes) 1 10'  -TL      Additional Comments level 3  -TL      Exercise 2    Exercise Name 2 Pulleys for ROM UE flexion  -TL      Time (Seconds) 2 2 mins  -TL      Exercise 3    Exercise Name 3 Single support fwd/back gait in //bars  -TL      Additional Comments 5 laps in // bars  -TL      Exercise 4    Exercise Name 4 lateral side stepping with single UE support   -TL      Cueing 4 Verbal   to keep right hip forward  -TL      Exercise 5    Exercise Name 5 airex single hand support ham curls  -TL      Sets 5 2  -TL      Reps 5 10  -TL      Exercise 6    Exercise Name 6 airex single hand support marching  -TL      Sets 6 2  -TL      Reps 6 10  -TL      Exercise 7    Exercise Name 7 Airex SLR 3-way LE  -TL      Sets 7 1  -TL      Reps 7 10  -TL      Additional Comments single hand support  -TL      Exercise 8    Exercise Name 8 Gait with cane  -TL      Additional Comments 40 feet with unsteady gait.  -TL        User Key  (r) = Recorded By, (t) = Taken By, (c) = Cosigned By    Initials Name Provider Type    TL Mara Head PTA Physical Therapy Assistant                               PT OP Goals       01/29/18 0800       PT Short Term Goals    STG Date to Achieve 02/08/18  -TL     STG 1 Independent in HEP   -TL     STG 1 Progress Ongoing  -TL     STG 2 Static stand narrow MARC 2' with minimal sway  -TL     STG 2 Progress Progressing  -TL     STG 3 Knee and ankle MMT 5/5  -TL     STG 3 Progress Progressing  -TL     STG 4 Hip MMT 5/5   -TL     STG 4 Progress Progressing  -TL     STG 5 Ambulate community distances with cane safely   -TL      STG 5 Progress Progressing  -TL     Time Calculation    PT Goal Re-Cert Due Date 02/08/18  -TL       User Key  (r) = Recorded By, (t) = Taken By, (c) = Cosigned By    Initials Name Provider Type    KARINE Head PTA Physical Therapy Assistant          Therapy Education  Given: HEP, Fall prevention and home safety  Program: Reinforced  How Provided: Verbal, Demonstration  Provided to: Patient  Level of Understanding: Verbalized, Demonstrated              Time Calculation:   Start Time: 0803  Stop Time: 0850  Time Calculation (min): 47 min  Total Timed Code Minutes- PT: 47 minute(s)    Therapy Charges for Today     Code Description Service Date Service Provider Modifiers Qty    80064458883 HC PT THER PROC EA 15 MIN 1/29/2018 Mara Head PTA GP 3                    Mara Head PTA  1/29/2018

## 2018-01-31 ENCOUNTER — APPOINTMENT (OUTPATIENT)
Dept: OCCUPATIONAL THERAPY | Facility: HOSPITAL | Age: 63
End: 2018-01-31

## 2018-01-31 ENCOUNTER — APPOINTMENT (OUTPATIENT)
Dept: PHYSICAL THERAPY | Facility: HOSPITAL | Age: 63
End: 2018-01-31

## 2018-02-05 ENCOUNTER — HOSPITAL ENCOUNTER (OUTPATIENT)
Dept: PHYSICAL THERAPY | Facility: HOSPITAL | Age: 63
Setting detail: THERAPIES SERIES
Discharge: HOME | End: 2018-02-05

## 2018-02-05 ENCOUNTER — HOSPITAL ENCOUNTER (OUTPATIENT)
Dept: OCCUPATIONAL THERAPY | Facility: HOSPITAL | Age: 63
Setting detail: THERAPIES SERIES
Discharge: HOME | End: 2018-02-05

## 2018-02-05 DIAGNOSIS — I63.9 STROKE OF UNKNOWN CAUSE (HCC): Primary | ICD-10-CM

## 2018-02-05 DIAGNOSIS — Z74.09 IMPAIRED MOBILITY AND ADLS: Primary | ICD-10-CM

## 2018-02-05 DIAGNOSIS — M62.81 MUSCLE WEAKNESS: ICD-10-CM

## 2018-02-05 DIAGNOSIS — Z78.9 IMPAIRED INSTRUMENTAL ACTIVITIES OF DAILY LIVING (IADL): ICD-10-CM

## 2018-02-05 DIAGNOSIS — Z78.9 IMPAIRED MOBILITY AND ADLS: Primary | ICD-10-CM

## 2018-02-05 DIAGNOSIS — R53.1 WEAKNESS: ICD-10-CM

## 2018-02-05 PROCEDURE — 97110 THERAPEUTIC EXERCISES: CPT

## 2018-02-05 PROCEDURE — 97530 THERAPEUTIC ACTIVITIES: CPT

## 2018-02-05 NOTE — THERAPY TREATMENT NOTE
Outpatient Occupational Therapy Rehab Program Treatment  [facility]     Patient Name: Bakari Dover  : 1955  MRN: [id number]  Today's Date: 2018        Visit Date: 2018    There is no problem list on file for this patient.  Visit Number: 13/13  % Improvement: 60%  Recert Date: 2018  MD visit: N/A    Total Insurance Visits Approved: 22       Past Medical History:   Diagnosis Date   • Diabetes mellitus    • Hypertension    • Kidney stone    • Renal disorder         Past Surgical History:   Procedure Laterality Date   • BACK SURGERY     • BRAIN SURGERY           Visit Dx:    ICD-10-CM ICD-9-CM   1. Impaired mobility and ADLs Z74.09 799.89   2. Impaired instrumental activities of daily living (IADL) R53.81 799.3   3. Muscle weakness M62.81 728.87               OT Neuro       18 0845          Subjective Comments    Subjective Comments Pt here after PT session, he reports he walked a good distance with SC this date. Pt reports his neurologist appointment went well, his MD recommended a change in diet d/t sugar levels. Pt reports his right knee has been giving him some trouble and has been giving out onhim but he feels it is improved since last week. Pt reported using SC when he is around other people who could provide assistance as needed.   -MR      Precautions and Contraindications    Precautions/Limitations fall precautions  -MR      Subjective Pain    Able to rate subjective pain? yes  -MR      Pre-Treatment Pain Level 2  -MR      Post-Treatment Pain Level 2  -MR      Subjective Pain Comment Pt reported he does not really have pain but rather discomfort in lower back, R knee, and B shoulders  -MR      Cognitive Assessment/Intervention    Current Cognitive/Communication Assessment functional  -MR      Orientation Status oriented x 4  -MR      Follows Commands/Answers Questions 75% of the time;able to follow single-step instructions;needs cueing;needs increased time;needs  repetition  -MR      Personal Safety mild impairment  -MR      Personal Safety Interventions fall prevention program maintained;gait belt;muscle strengthening facilitated;nonskid shoes/slippers when out of bed;supervised activity  -MR      Cognition Comments Pt required mod VC to follow instructions and sequencing during functional dynamic standing balance task  -MR      Sensation    Additional Comments Pt has yet to purchase ove glove, reports he will be ordering one online soon  -MR      Posture/Observations    Alignment Options Rounded shoulders  -MR      Rounded Shoulders Bilateral:;Mild  -MR      Posture/Observations Comments Pt able to correct sitting posture with min VC  -MR      ROM (Range of Motion)    General ROM Detail Pt completed BUE AROM exercises with a lightweight dowel lashaun < 1 #. Pt completed shoulder flexion while supine on mat table 2 x 10 with min VC for appropriate technique and to stay slow and in control of movements, pt also completed chest press in supine 2 x 10. Pt completed shoulder extension with light weight dowel lashaun < 1# sitting edge of mat table, 2 x 10, instructed to hold stretch for 3 seconds and to hols head up and look forward, pt also completed shoulder ab/adduction while sitting edge of mat table, 2 x 10. Pt reported his shoulders felt better and more relaxed after completing stretches, educated pt to complete these at home as part of curent HEP. Pt educated to complete B tendon glides as well this date and instructed to complete these at home as part of HEP.   -MR      Bed Mobility    Bed Mob, Supine to Sit, [place] conditional independence   Required increased time, mod difficulty noted  -MR      Bed Mob, Sit to Supine, [place] conditional independence  -MR      Transfers    Transfers, Sit-Stand [place] stand by assist;contact guard assist  -MR      Transfers, Stand-Sit [place] stand by assist;contact guard assist  -MR      Transfer, Comment T/f required  SBA/CGA d/t nature of task and increased level of fatigue from completing PT session prior to OT session this date  -MR      Functional Mobility    Functional Mobility- Ind. Level supervision required;conditional independence  -MR      Functional Mobility- Device rollator  -MR      Functional Mobility- Comment Pt demonstrated 1 instance of decreased safety awareness with rollator, he forgot to lock his brakes prior to t/f, required VC  -MR      ADL Assessment/Intervention    IADL Assess/Train, Comment Pt completed functional dynamic standing balance task to simulate safety within the home. Dynamic standing balance task was set-up to require pt to retrieve items from various heights including above shoulder height and below knee level, pt utilized the wall as support ~ 50% of task. He completed 3 x 5 minute stands with CGA, pt was required to scan environment for appropriate target to enhance spatial awareness and increase attention to task. Pt was required to side-step and take forward steps then return feet together to challenge standing balance, pt required mod VC to follow instructions and complete appropriate sequence. Pt required a seated recovery period following each stand, demonstrating decreased activity tolerance.   -MR      Balance Skills Training    Standing-Level of Assistance Contact guard  -MR      Static Standing Balance Support Right upper extremity supported;Left upper extremity supported  -MR      Standing-Balance Activities Weight Shift A-P;Weight Shift R-L;Forward lean;Lateral lean;Reaching for objects;Reaching across midline  -MR      Standing Balance # of Minutes 5   3 x 5 minutes  -MR      Gait Balance-Level of Assistance Close supervision  -MR      Gait Balance Support assistive device  -MR        User Key  (r) = Recorded By, (t) = Taken By, (c) = Cosigned By    Initials Name Provider Type    MR Michelle Santana, OT Occupational Therapist                  Therapy Education  Education  Details: Pt educated on progressed HEP including BUE AROM exercises. Pt educated on EC and home safety during transition from rollator to SC, at present recommended pt continue to use rollator within home especially in the kitchen to assist with EC and safety.   Given: HEP, Fall prevention and home safety, Symptoms/condition management  Program: Progressed  How Provided: Verbal, Demonstration  Provided to: Patient  Level of Understanding: Verbalized, Demonstrated          OT Assessment/Plan       02/05/18 0845 02/05/18 0800    OT Assessment    Assessment Comments Pt participated well this session with fair endurance and activity tolerance noted after completing PT session. Pt required rest breaks following each functional standing balance task. Pt demonstrated mod difficulty sequencing and sttending to instructions this date. Pt would continue to benefit from skilled OT sessions to address decreased strength, balance, transfers, functional mobility, FMC/GMC, and decreased independence with ADL's/IADL's. Recommend cont POC.   -MR     OT Plan    OT Frequency 2x/week  -MR     Predicted Duration of Therapy Intervention (days/wks)  4 weeks  -      User Key  (r) = Recorded By, (t) = Taken By, (c) = Cosigned By    Initials Name Provider Type     Michelle KUMAR Max, OT Occupational Therapist    YAN Doshi, PT Physical Therapist                 OT Goals       02/05/18 0845       OT Short Term Goals    STG Date to Achieve --   by 4 weeks  -MR     STG 1 Pt will complete FM activities with 75% accuracy on at least 2/3 sessions.   -MR     STG 1 Progress Progressing  -MR     STG 2 Pt will complete BUE AROM exercises in all planes to increase strength to 4+/5 to increase functional independence with ADL's and IADL's.  -MR     STG 2 Progress Progressing  -MR     STG 3 Pt will tolerate 5-7 mins of functional standing activity with no lose of balance or increased pain on 2/3 sessions.   -MR     STG 3 Progress Met;Ongoing  -MR      STG 3 Progress Comments Pt completed 3 x 5 minute stand with no LOB  -MR     STG 4 Pt will complete BUE coordination task with 75% accuracy on at least 2/3 sessions.   -MR     STG 4 Progress Met;Ongoing  -MR     Long Term Goals    LTG Date to Achieve --   by d/c  -MR     LTG 1 Pt will be independent with progressing HEP on at least 3/3 sessions.  -MR     LTG 1 Progress Met;Ongoing  -MR     LTG 2 Pt will be able to simulate and/or report conditional independence with efficient time frame with all ADL's on at least 2/3 sessions.   -MR     LTG 2 Progress Partially Met;Ongoing  -MR     LTG 3 Pt will be able to simulate and/or report conditional independence with efficient time frame with IADL skills including light house keeping tasks, meal preparation, and general home management skills on at least 2/3 sessions.  -MR     LTG 3 Progress Partially Met  -MR     LTG 4 Pt will tolerate 45 minute treatment with no fatigue/rest breaks required on at least 2/3sessions.  -MR     LTG 4 Progress Progressing  -MR     LTG 5 Pt will demonstrate/report increased spatial awareness and safety awareness during cooking task/functional activity on 2/3 sessions.   -MR     LTG 5 Progress Progressing  -MR     LTG 6 Pt will engage in BUE ROM exercises/streches with 75% improvement of function on 2/3 sessions.   -MR     LTG 6 Progress Progressing  -MR     LTG 6 Progress Comments Provided HEP to address BUE AROM  -MR     Time Calculation    OT Goal Re-Cert Due Date 02/28/18  -MR       User Key  (r) = Recorded By, (t) = Taken By, (c) = Cosigned By    Initials Name Provider Type    MR Michelle Santana OT Occupational Therapist                              Time Calculation:   OT Start Time: 0845  OT Stop Time: 0930  OT Time Calculation (min): 45 min  Total Timed Code Minutes- OT: 45 minute(s)     Therapy Charges for Today     Code Description Service Date Service Provider Modifiers Qty    [id number]  OT THERAPEUTIC ACT EA 15 MIN 2/5/2018  Michelle Santana OT GO 2    [id number]  OT THER PROC EA 15 MIN 2/5/2018 Michelle Santana OT GO 1                    Michelle Santana OT  2/5/2018

## 2018-02-05 NOTE — THERAPY PROGRESS REPORT/RE-CERT
Outpatient Physical Therapy Ortho Progress Note  [facility]     Patient Name: Bakari Dover  : 1955  MRN: [id number]  Today's Date: 2018      Insurance [employer]   Visit # 9/9   Next MD Visit TBD 2018   Recert Date 18           Visit Date: 2018    There is no problem list on file for this patient.       Past Medical History:   Diagnosis Date   • Diabetes mellitus    • Hypertension    • Kidney stone    • Renal disorder         Past Surgical History:   Procedure Laterality Date   • BACK SURGERY     • BRAIN SURGERY         Visit Dx:     ICD-10-CM ICD-9-CM   1. Stroke of unknown cause I63.9 434.91   2. Weakness R53.1 780.79                     PT Neuro       18 0800          Subjective Comments    Subjective Comments Patient reports he is doing well and making small increments of progress. Patient states he has been using rollator for ambulation most of the time, but also uses the SC when people are with him  -WC      Precautions and Contraindications    Precautions/Limitations fall precautions  -      Precautions Monitor L knee (arhtritis and gives at times)  -      Subjective Pain    Able to rate subjective pain? yes  -      Pre-Treatment Pain Level 5  -      Subjective Pain Comment B knees (R>L)  -      MMT (Manual Muscle Testing)    General MMT Assessment Detail B hip flex 4/5, B quads 5/5, B HS 5/5, B ankle DF 4/5  -        User Key  (r) = Recorded By, (t) = Taken By, (c) = Cosigned By    Initials Name Provider Type    WC Bakari Doshi, PT Physical Therapist                  Therapy Education  Education Details: Patient educated on updated PT POC and goals. Patient's HEP also progressed including standing therex for strengthening  Given: HEP, Fall prevention and home safety, Mobility training  Program: Progressed  How Provided: Verbal  Provided to: Patient  Level of Understanding: Verbalized, Demonstrated           PT OP Goals       18 0800       PT  Short Term Goals    STG Date to Achieve 02/26/18  -     STG 1 Independent in HEP   -     STG 1 Progress Ongoing  -     STG 2 Static stand narrow MARC 2' with minimal sway  -     STG 2 Progress Met  -     STG 3 Knee and ankle MMT 5/5  -     STG 3 Progress Progressing  -     STG 3 Progress Comments ankle DF 4/5  -     STG 4 Hip MMT 5/5   -     STG 4 Progress Progressing  -     STG 4 Progress Comments hip flex 4/5  -     STG 5 Ambulate community distances with cane safely   -     STG 5 Progress Progressing  -     STG 5 Progress Comments 270' with CGA - unsteady gait, poor endurance  -     STG 6 Patient will be able to perform static tandem stance with no UE x 30 sec witih minimal sway  -     STG 6 Progress New  -     Time Calculation    PT Goal Re-Cert Due Date 02/26/18  -       User Key  (r) = Recorded By, (t) = Taken By, (c) = Cosigned By    Initials Name Provider Type     Bakari Doshi, PT Physical Therapist                PT Assessment/Plan       02/05/18 0800       PT Assessment    Functional Limitations Impaired gait;Limitation in home management;Limitations in community activities;Limitations in functional capacity and performance;Performance in leisure activities;Performance in self-care ADL  -     Impairments Balance;Coordination;Endurance;Gait;Muscle strength;Pain;Poor body mechanics;Posture;Range of motion;Sensation  -     Assessment Comments Patient continues to demonstrate decreased LE strength, impaired gait, and decreased endurance which limits overall functional mobility. Patient has demonstrated progress as indicated by achievement of balance goal. Patient's gait has progressed gait with SC, but continues to demonstrate decreased safety due to buckling of R knee. Patient would continue to benefit from skilled PT services for strength, balance, and gait training to improve functional mobility  -     PT Plan    PT Frequency 2x/week  -     Predicted Duration of  Therapy Intervention (days/wks) 4 weeks  -WC     Planned CPT's? PT RE-EVAL: 11982;PT THER PROC EA 15 MIN: 90004;PT THER ACT EA 15 MIN: 62998;PT MANUAL THERAPY EA 15 MIN: 22752;PT NEUROMUSC RE-EDUCATION EA 15 MIN: 12944;PT GAIT TRAINING EA 15 MIN: 36870;PT SELF CARE/HOME MGMT/TRAIN EA 15: 00578;PT THER SUPP EA 15 MIN  -WC     PT Plan Comments Continue with current PT POC including progression of strength, gait with SC and balance. Monitor endurance level, and progress as tolerated  -       User Key  (r) = Recorded By, (t) = Taken By, (c) = Cosigned By    Initials Name Provider Type    WC Bakari Doshi, PT Physical Therapist                  Exercises       02/05/18 0800          Subjective Comments    Subjective Comments Patient reports he is doing well and making small increments of progress. Patient states he has been using rollator for ambulation most of the time, but also uses the SC when people are with him  -WC      Subjective Pain    Able to rate subjective pain? yes  -WC      Pre-Treatment Pain Level 5  -WC      Subjective Pain Comment B knees (R>L)  -WC      Exercise 1    Exercise Name 1 Pro II LE strength  -WC      Time (Minutes) 1 8'  -WC      Additional Comments L 3  -WC      Exercise 2    Exercise Name 2 Seated CR/TR  -WC      Sets 2 2  -WC      Reps 2 20  -WC      Exercise 3    Exercise Name 3 Ambulation x 270' with straight cane for endurance and mechanics  -WC      Additional Comments CGA  -WC      Exercise 4    Exercise Name 4 standing marching  -WC      Sets 4 2  -WC      Reps 4 10  -WC      Exercise 5    Exercise Name 5 standing hip ABD  -WC      Sets 5 2  -WC      Reps 5 10  -WC      Exercise 6    Exercise Name 6 Tandem stance 1 finger suipport  -WC      Sets 6 2  -WC      Time (Seconds) 6 30 sec  -WC      Exercise 7    Exercise Name 7 SLS issac  -WC      Sets 7 2  -WC      Time (Seconds) 7 30  -WC      Additional Comments each leg  -WC        User Key  (r) = Recorded By, (t) = Taken By, (c) =  Cosigned By    Initials Name Provider Type    WC Bakari Doshi PT Physical Therapist                                  Time Calculation:   Start Time: 0803  Stop Time: 0845  Time Calculation (min): 42 min  Total Timed Code Minutes- PT: 42 minute(s)     Therapy Charges for Today     Code Description Service Date Service Provider Modifiers Qty    [id number]  PT THER PROC EA 15 MIN 2/5/2018 Bakari Doshi, PT GP 2    [id number]  PT THERAPEUTIC ACT EA 15 MIN 2/5/2018 Bakari Doshi, PT GP 1                    Bakari Doshi PT  2/5/2018

## 2018-02-07 ENCOUNTER — APPOINTMENT (OUTPATIENT)
Dept: PHYSICAL THERAPY | Facility: HOSPITAL | Age: 63
End: 2018-02-07

## 2018-02-07 ENCOUNTER — APPOINTMENT (OUTPATIENT)
Dept: OCCUPATIONAL THERAPY | Facility: HOSPITAL | Age: 63
End: 2018-02-07

## 2018-02-12 ENCOUNTER — APPOINTMENT (OUTPATIENT)
Dept: OCCUPATIONAL THERAPY | Facility: HOSPITAL | Age: 63
End: 2018-02-12

## 2018-02-14 ENCOUNTER — APPOINTMENT (OUTPATIENT)
Dept: OCCUPATIONAL THERAPY | Facility: HOSPITAL | Age: 63
End: 2018-02-14

## 2018-02-14 ENCOUNTER — APPOINTMENT (OUTPATIENT)
Dept: PHYSICAL THERAPY | Facility: HOSPITAL | Age: 63
End: 2018-02-14

## 2018-02-19 ENCOUNTER — APPOINTMENT (OUTPATIENT)
Dept: OCCUPATIONAL THERAPY | Facility: HOSPITAL | Age: 63
End: 2018-02-19

## 2018-02-19 ENCOUNTER — APPOINTMENT (OUTPATIENT)
Dept: PHYSICAL THERAPY | Facility: HOSPITAL | Age: 63
End: 2018-02-19

## 2018-03-28 ENCOUNTER — DOCUMENTATION (OUTPATIENT)
Dept: PHYSICAL THERAPY | Facility: HOSPITAL | Age: 63
End: 2018-03-28

## 2018-04-23 ENCOUNTER — DOCUMENTATION (OUTPATIENT)
Dept: OCCUPATIONAL THERAPY | Facility: HOSPITAL | Age: 63
End: 2018-04-23

## 2018-04-23 NOTE — THERAPY DISCHARGE NOTE
Outpatient Occupational Therapy Discharge Summary         Patient Name: Bakari Dover  : 1955  MRN: [id number]    Today's Date: 2018      Visit Date: 2018           OP OT Discharge Summary  Date of Discharge: 18  Reason for Discharge: other (comment) (Unable to reach pt to resume OT services. OT will sign off at this time. )  Outcomes Achieved: Patient able to partially acheive established goals  Discharge Destination: Home with home program  Discharge Instructions: Pt able to met 2/4 STG, 1/6 LTG, and partially met 2/6 LTG. Pt was provided with extensive HEP program to be carried out at home. Attempted to speak to pt multiple times to reschedule OT treatment sessions, also left pt voicemails. Pt has not responded to calls at this time. OT will d/c this date.       Time Calculation:                         Michelle Santana OT   2018

## 2023-03-23 ENCOUNTER — OFFICE VISIT (OUTPATIENT)
Dept: SLEEP MEDICINE | Facility: HOSPITAL | Age: 68
End: 2023-03-23
Payer: MEDICARE

## 2023-03-23 VITALS
OXYGEN SATURATION: 94 % | WEIGHT: 315 LBS | HEART RATE: 72 BPM | DIASTOLIC BLOOD PRESSURE: 92 MMHG | HEIGHT: 73 IN | SYSTOLIC BLOOD PRESSURE: 176 MMHG | BODY MASS INDEX: 41.75 KG/M2

## 2023-03-23 DIAGNOSIS — R06.81 WITNESSED EPISODE OF APNEA: ICD-10-CM

## 2023-03-23 DIAGNOSIS — G25.81 RESTLESS LEGS SYNDROME: ICD-10-CM

## 2023-03-23 DIAGNOSIS — G47.33 OBSTRUCTIVE SLEEP APNEA: Primary | ICD-10-CM

## 2023-03-23 DIAGNOSIS — G47.19 EXCESSIVE DAYTIME SLEEPINESS: ICD-10-CM

## 2023-03-23 DIAGNOSIS — R06.83 SNORING: ICD-10-CM

## 2023-03-23 PROCEDURE — 99203 OFFICE O/P NEW LOW 30 MIN: CPT | Performed by: NURSE PRACTITIONER

## 2023-03-23 RX ORDER — VIBEGRON 75 MG/1
1 TABLET, FILM COATED ORAL DAILY
COMMUNITY
Start: 2023-01-07

## 2023-03-23 RX ORDER — MELOXICAM 15 MG/1
TABLET ORAL
COMMUNITY
Start: 2023-01-07

## 2023-03-23 RX ORDER — ALBUTEROL SULFATE 90 UG/1
2 AEROSOL, METERED RESPIRATORY (INHALATION) EVERY 4 HOURS PRN
COMMUNITY
Start: 2023-03-13

## 2023-03-23 RX ORDER — DULAGLUTIDE 0.75 MG/.5ML
INJECTION, SOLUTION SUBCUTANEOUS
COMMUNITY
Start: 2023-03-11

## 2023-03-23 RX ORDER — INSULIN HUMAN 100 [IU]/ML
INJECTION, SUSPENSION SUBCUTANEOUS
COMMUNITY
Start: 2023-01-21

## 2023-03-23 RX ORDER — LORATADINE 10 MG/1
10 TABLET ORAL EVERY MORNING
COMMUNITY
Start: 2023-02-02

## 2023-03-23 RX ORDER — FINASTERIDE 5 MG/1
TABLET, FILM COATED ORAL
COMMUNITY
Start: 2023-01-07

## 2023-03-23 RX ORDER — LISINOPRIL AND HYDROCHLOROTHIAZIDE 25; 20 MG/1; MG/1
TABLET ORAL
COMMUNITY
Start: 2023-01-07

## 2023-03-23 RX ORDER — FAMOTIDINE 40 MG/1
TABLET, FILM COATED ORAL
COMMUNITY
Start: 2023-01-07

## 2023-03-23 RX ORDER — PANTOPRAZOLE SODIUM 40 MG/1
TABLET, DELAYED RELEASE ORAL
COMMUNITY
Start: 2023-01-07

## 2023-03-23 RX ORDER — ASPIRIN 325 MG
TABLET ORAL
COMMUNITY

## 2023-03-23 NOTE — PROGRESS NOTES
New Patient Sleep Medicine Consultation    Encounter Date: 3/23/2023         Patient's Primary Care Provider: Celso Blanco MD  Referring Provider: No ref. provider found  Reason for consultation/chief complaint: snoring, awakening gasping for breath, witnessed apneas, excessive daytime sleepiness, unrefreshing sleep and insomnia    Bakari Dover is a 68 y.o. male who admits to snoring, unrestful sleep, gasping during sleep, excessive daytime sleepiness, stopping breathing during sleep, disturbed or restless sleep, up to bathroom at night, sleepy driving, restless legs at night and difficulty staying asleep.    He denies cataplexy, sleep paralysis, or hypnagogic hallucinations. His bedtime is ~ 2200. He  falls asleep after 1-5 minutes, and is up 4-5 times per night. He wakes up ~ 0530. He endorses 7 hours of sleep. He drinks 2 cups of coffee, 0 teas, and 0 sodas per day. He drinks 0 alcoholic beverages per week. He is not a current smoker. He does not take sedatives or hypnotics. He has occasional sleepiness with driving. He naps every day after lunch for ~20 minutes.     Patient was diagnosed with TRAMAINE many years ago, and was on CPAP until ~5-6 years ago. His current machine is affected by the [employer] safety recall. He is interested in getting restarted on PAP therapy.    [place] - 20  [place] Sleepiness Scale  Sitting and reading: High chance of dozing  Watching TV: High chance of dozing  Sitting, inactive in a public place (e.g. a theatre or a meeting): High chance of dozing  As a passenger in a car for an hour without a break: High chance of dozing  Lying down to rest in the afternoon when circumstances permit: High chance of dozing  Sitting and talking to someone: Moderate chance of dozing  Sitting quietly after a lunch without alcohol: Moderate chance of dozing  In a car, while stopped for a few minutes in traffic: Slight chance of dozing  Total score: 20    Prior Sleep  Testin. PSG on ?, AHI of ?  2. Previously on CPAP, has not been on for 5-6 years    Past Medical History:   Diagnosis Date   • Diabetes mellitus (HCC)    • Hypertension    • Kidney stone    • Renal disorder      Social History     Socioeconomic History   • Marital status:    Tobacco Use   • Smoking status: Former   • Smokeless tobacco: Never   • Tobacco comments:     over 40 years ago   Substance and Sexual Activity   • Alcohol use: No     Family History   Problem Relation Age of Onset   • Sleep apnea Father    • Heart attack Maternal Grandmother         Prior T&A, UPPP, maxillofacial, or bariatric surgery: None  Family history of sleep disorders: Wife - TRAMAINE on CPAP; father - TRAMAINE on CPAP  Other family history + for: As above  Occupation: Retired ,   Marital status:   Children: 2  Has 1 brothers and 2 sisters  Smoking history: smoked 2 ppds from age 13 until 21      Review of Systems:  Constitutional: positive for fatigue  Ears, nose, mouth, throat, and face: positive for snoring  Respiratory: positive for dyspnea on exertion  Cardiovascular: negative  Gastrointestinal: negative  Genitourinary:negative  Musculoskeletal:negative  Neurological: positive for coordination problems and frequent falls  Behavioral/Psych: positive for fatigue and sleep disturbance  Patient advised to discuss any positive ROS with PCP.      Vitals:    23 1316   BP: 176/92   Pulse: 72   SpO2: 94%           23  1316   Weight: (!) 166 kg (366 lb)       Body mass index is 48.3 kg/m². Class 3 Severe Obesity (BMI >=40). Obesity-related health conditions include the following: obstructive sleep apnea, hypertension, diabetes mellitus and GERD. Obesity is newly identified. BMI is is above average; BMI management plan is completed. I recommend portion control and increasing exercise.    Tobacco Use: Medium Risk   • Smoking Tobacco Use: Former   • Smokeless Tobacco Use: Never   • Passive Exposure:  "Not on file       Physical Exam:        General: Alert. Cooperative. Well developed. No acute distress.   Head/Neck:  Normocephalic. Symmetrical. Atraumatic.     Neck circumference: 20\"             Eyes: Sclera clear. No icterus. PERRLA. Normal EOM.             Ears: No deformities. Normal hearing.             Nose: No septal deviation. No drainage.          Throat: No oral lesions. No thrush. Moist mucous membranes. Trachea midline.    Tongue is normal     Dentition is good       Pharynx: Posterior pharyngeal pillars are narrow    Mallampati score of III (soft and hard palate and base of uvula visible)    Pharynx is normal with unrermarkable tonsils   Chest Wall:  Normal shape. Symmetric expansion with respiration. No tenderness.          Lungs:  Clear to auscultation bilaterally. No wheezes. No rhonchi. No rales. Respirations regular, even and unlabored.            Heart:  Regular rhythm and normal rate. Normal S1 and S2. No murmur.     Abdomen:  Soft, non-tender and non-distended. Normal bowel sounds. No masses.  Extremities:  Moves all extremities well. No edema.           Pulses: Pulses palpable and equal bilaterally.               Skin: Dry. Intact. No bleeding. No rash.           Neuro: Moves all 4 extremities and cranial nerves grossly intact.  Psychiatric: Normal mood and affect.      Current Outpatient Medications:   •  amLODIPine (NORVASC) 10 MG tablet, Take 1 tablet by mouth Daily., Disp: , Rfl:   •  aspirin 325 MG tablet, aspirin 325 mg tablet  Take 1 tablet every day by oral route., Disp: , Rfl:   •  Cholecalciferol 1000 units tablet, Take 1 tablet by mouth Daily., Disp: , Rfl:   •  famotidine (PEPCID) 40 MG tablet, , Disp: , Rfl:   •  finasteride (PROSCAR) 5 MG tablet, , Disp: , Rfl:   •  Gemtesa 75 MG tablet, Take 1 tablet by mouth Daily., Disp: , Rfl:   •  HumuLIN 70/30 KwikPen (70-30) 100 UNIT/ML suspension pen-injector, INJECT 70 UNITS SUBCUTANEOUSLY WITH BREAKFAST, 25 UNITS AT LUNCH, AND 50 UNITS " IN THE EVENING. ADJUST AS INSTRUCTED. MAX DAILY DOSE 200 UNITS, Disp: , Rfl:   •  lisinopril-hydrochlorothiazide (PRINZIDE,ZESTORETIC) 20-25 MG per tablet, , Disp: , Rfl:   •  loratadine (CLARITIN) 10 MG tablet, Take 1 tablet by mouth Every Morning., Disp: , Rfl:   •  meloxicam (MOBIC) 15 MG tablet, , Disp: , Rfl:   •  metFORMIN (GLUCOPHAGE) 1000 MG tablet, , Disp: , Rfl:   •  pantoprazole (PROTONIX) 40 MG EC tablet, , Disp: , Rfl:   •  rOPINIRole (REQUIP) 0.25 MG tablet, Take 1 tablet by mouth Every Night. Take 1 hour before bedtime., Disp: , Rfl:   •  Tamsulosin HCl (FLOMAX PO), Take  by mouth., Disp: , Rfl:   •  Trulicity 0.75 MG/0.5ML solution pen-injector, INJECT 0.75 Milligram SUBCUTANEOUSLY ONCE A WEEK, Disp: , Rfl:   •  albuterol sulfate  (90 Base) MCG/ACT inhaler, Inhale 2 puffs Every 4 (Four) Hours As Needed., Disp: , Rfl:   •  BACLOFEN PO, Take  by mouth. (Patient not taking: Reported on 3/23/2023), Disp: , Rfl:   •  nitrofurantoin, macrocrystal-monohydrate, (MACROBID) 100 MG capsule, Take 1 capsule by mouth 2 (Two) Times a Day. (Patient not taking: Reported on 3/23/2023), Disp: 14 capsule, Rfl: 0  •  Phenazopyridine HCl (AZO TABS PO), Take  by mouth. (Patient not taking: Reported on 3/23/2023), Disp: , Rfl:   •  Potassium (POTASSIMIN PO), Take  by mouth. (Patient not taking: Reported on 3/23/2023), Disp: , Rfl:   •  psyllium (METAMUCIL SMOOTH TEXTURE) 28 % packet, Take 1 packet by mouth 2 (Two) Times a Day. (Patient not taking: Reported on 3/23/2023), Disp: , Rfl:     No results found for: WBC, RBC, HGB, HCT, MCV, MCH, MCHC, RDW, RDWSD, MPV, PLT, NEUTRORELPCT, LYMPHORELPCT, MONORELPCT, EOSRELPCT, BASORELPCT, AUTOIGPER, NEUTROABS, LYMPHSABS, MONOSABS, EOSABS, BASOSABS, AUTOIGNUM, NRBC, IRON, FERRITIN  No results found for: GLUCOSE, BUN, CREATININE, EGFRRESULT, EGFR, BCR, K, CO2, CALCIUM, PROTENTOTREF, ALBUMIN, BILITOT, AST, ALT    Contraindications to home sleep test:  none    ASSESSMENT:  1. Excessive daytime sleepiness, presumed obstructive sleep apnea - New (to me), additional work-up planned (4)  1. Check home sleep study (disposable) (re-qualifying)  2. Follow up for results  3. Pertinent labs were reviewed as listed above  2. Snoring, presumed obstructive sleep apnea - New (to me), additional work-up planned (4)  1. As above  3. Frequent nocturnal awakenings - New (to me), additional work-up planned (4)  1. As above  4. Restless leg syndrome/ Periodic limb movement disorder - (RLS/PLMD) - New (to me), additional work-up planned (4)  1. Address after further testing  2. Continue Requip, prescribed by PCP  5. Morbid obesity - BMI 48.3 - stable chronic illness      I spent 30 minutes caring for Bakari on this date of service. This time includes time spent by me in the following activities: preparing for the visit, reviewing tests, obtaining and/or reviewing a separately obtained history, performing a medically appropriate examination and/or evaluation , counseling and educating the patient/family/caregiver, ordering medications, tests, or procedures, documenting information in the medical record and care coordination; discussing Further testing    RTC 2 weeks after testing. Patient agrees to return sooner if changes in symptoms.         This document has been electronically signed by AMBER Mcmullen on March 23, 2023 13:32 CDT          CC: Celso Blanco MD          No ref. provider found

## 2023-04-03 ENCOUNTER — HOSPITAL ENCOUNTER (OUTPATIENT)
Dept: SLEEP MEDICINE | Facility: HOSPITAL | Age: 68
Discharge: HOME | End: 2023-04-03
Admitting: NURSE PRACTITIONER
Payer: MEDICARE

## 2023-04-03 DIAGNOSIS — R06.81 WITNESSED EPISODE OF APNEA: ICD-10-CM

## 2023-04-03 DIAGNOSIS — G47.19 EXCESSIVE DAYTIME SLEEPINESS: ICD-10-CM

## 2023-04-03 DIAGNOSIS — G25.81 RESTLESS LEGS SYNDROME: ICD-10-CM

## 2023-04-03 DIAGNOSIS — G47.33 OBSTRUCTIVE SLEEP APNEA: ICD-10-CM

## 2023-04-03 DIAGNOSIS — R06.83 SNORING: ICD-10-CM

## 2023-04-03 PROCEDURE — 95800 SLP STDY UNATTENDED: CPT

## 2023-04-04 PROCEDURE — 95800 SLP STDY UNATTENDED: CPT | Performed by: PSYCHIATRY & NEUROLOGY

## 2023-04-19 ENCOUNTER — OFFICE VISIT (OUTPATIENT)
Dept: SLEEP MEDICINE | Facility: HOSPITAL | Age: 68
End: 2023-04-19
Payer: MEDICARE

## 2023-04-19 VITALS
HEART RATE: 72 BPM | HEIGHT: 73 IN | BODY MASS INDEX: 41.75 KG/M2 | DIASTOLIC BLOOD PRESSURE: 91 MMHG | WEIGHT: 315 LBS | SYSTOLIC BLOOD PRESSURE: 168 MMHG | OXYGEN SATURATION: 94 %

## 2023-04-19 DIAGNOSIS — G47.33 OBSTRUCTIVE SLEEP APNEA: Primary | ICD-10-CM

## 2023-04-19 DIAGNOSIS — E66.01 MORBID OBESITY: ICD-10-CM

## 2023-04-19 DIAGNOSIS — G47.34 NOCTURNAL HYPOXIA: ICD-10-CM

## 2023-04-19 NOTE — PROGRESS NOTES
Sleep Clinic Follow Up - Sleep Study Results    Date: 4/19/2023  Primary Care Provider: Celso Blanco MD  Chief complaint/Reason for visit: follow up sleep testing    Last office visit: 03/23/2023    HPI:  The patient is a 68 y.o. male who underwent home sleep testing on 04/03/2023.  The AHI/ZAIDA was 60, the RDI was 82. Pulse range of 54-96 bpm. O2 grayson of 80% with 134 minutes total sleep time SpO2 </=88%.      INTERVAL MEDICAL HISTORY: No change since last office visit in regard to the patient's bedtime routine, medications, or diagnosis.      Review of Systems:  Denies chest pain, shortness of breath, leg swelling, cough, fever, chills, abdominal pain, N/V/D.    MEDICATIONS:   Current Outpatient Medications:   •  albuterol sulfate  (90 Base) MCG/ACT inhaler, Inhale 2 puffs Every 4 (Four) Hours As Needed., Disp: , Rfl:   •  amLODIPine (NORVASC) 10 MG tablet, Take 1 tablet by mouth Daily., Disp: , Rfl:   •  aspirin 325 MG tablet, aspirin 325 mg tablet  Take 1 tablet every day by oral route., Disp: , Rfl:   •  famotidine (PEPCID) 40 MG tablet, , Disp: , Rfl:   •  finasteride (PROSCAR) 5 MG tablet, , Disp: , Rfl:   •  Gemtesa 75 MG tablet, Take 1 tablet by mouth Daily., Disp: , Rfl:   •  HumuLIN 70/30 KwikPen (70-30) 100 UNIT/ML suspension pen-injector, INJECT 70 UNITS SUBCUTANEOUSLY WITH BREAKFAST, 25 UNITS AT LUNCH, AND 50 UNITS IN THE EVENING. ADJUST AS INSTRUCTED. MAX DAILY DOSE 200 UNITS, Disp: , Rfl:   •  lisinopril-hydrochlorothiazide (PRINZIDE,ZESTORETIC) 20-25 MG per tablet, , Disp: , Rfl:   •  loratadine (CLARITIN) 10 MG tablet, Take 1 tablet by mouth Every Morning., Disp: , Rfl:   •  meloxicam (MOBIC) 15 MG tablet, , Disp: , Rfl:   •  metFORMIN (GLUCOPHAGE) 1000 MG tablet, , Disp: , Rfl:   •  pantoprazole (PROTONIX) 40 MG EC tablet, , Disp: , Rfl:   •  Potassium (POTASSIMIN PO), Take  by mouth., Disp: , Rfl:   •  rOPINIRole (REQUIP) 0.25 MG tablet, Take 1 tablet by mouth Every Night. Take 1  hour before bedtime., Disp: , Rfl:   •  Tamsulosin HCl (FLOMAX PO), Take  by mouth., Disp: , Rfl:   •  Trulicity 0.75 MG/0.5ML solution pen-injector, INJECT 0.75 Milligram SUBCUTANEOUSLY ONCE A WEEK, Disp: , Rfl:     PHYSICAL EXAM:      Vitals:    04/19/23 1502   BP: 168/91   Pulse: 72   SpO2: 94%         04/19/23  1502   Weight: (!) 166 kg (366 lb)     Body mass index is 48.3 kg/m².  Class 3 Severe Obesity (BMI >=40). Obesity-related health conditions include the following: obstructive sleep apnea, hypertension, diabetes mellitus and GERD. Obesity is unchanged. BMI is is above average; BMI management plan is completed. I recommend portion control and increasing exercise.    Gen:                No distress, conversant, pleasant, appears stated age, alert, oriented  Eyes:               Anicteric sclera, moist conjunctiva, no lid lag                           PERRL, EOMI   Heent:             NC/AT                          Oropharynx clear                          Normal hearing  Lungs:             Normal effort, non-labored breathing         CV:                  Normal S1/S2                          No lower extremity edema  ABD:               Soft, rounded, non-distended                 Psych:             Appropriate affect  Neuro:             CN 2-12 appear intact      No results found for: WBC, RBC, HGB, HCT, MCV, MCH, MCHC, RDW, RDWSD, MPV, PLT, NEUTRORELPCT, LYMPHORELPCT, MONORELPCT, EOSRELPCT, BASORELPCT, AUTOIGPER, NEUTROABS, LYMPHSABS, MONOSABS, EOSABS, BASOSABS, AUTOIGNUM, NRBC    No results found for: GLUCOSE, BUN, CREATININE, EGFRRESULT, EGFR, BCR, K, CO2, CALCIUM, PROTENTOTREF, ALBUMIN, BILITOT, AST, ALT      Assessment and Plan:    1. Obstructive sleep apnea, nocturnal hypoxia - New (to me), additional work-up planned (4)  1. The sleep study results were discussed in detail with the patient. The risks of untreated sleep apnea were reviewed. Treatment options for sleep apnea were discussed. I counseled  patient on sleep hygiene, including regular sleep wake schedule and stimulus control therapy, the importance of weight reduction, and abstaining from smoking and alcohol consumption  2. Proceed with in lab PAP titration, application of O2 in lab if required due to insurance requirement changes  3. Pertinent labs were reviewed as listed above  4. [facility] when ordering supplies after follow up visit  5. Follow up for results  6. TRAMAINE/CPAP education provided in AVS  2. Restless leg syndrome/PLMD - Established, stable (1)  1. Continue Requip 0.25 mg QHS  3. Morbid obesity - BMI 48.3 - stable chronic illness        All of the patient's questions were answered. He states understanding and agreement with my assessment and plan as above.     I spent 30 minutes caring for Bakari on this date of service. This time includes time spent by me in the following activities: preparing for the visit, reviewing tests, obtaining and/or reviewing a separately obtained history, performing a medically appropriate examination and/or evaluation , counseling and educating the patient/family/caregiver, ordering medications, tests, or procedures, documenting information in the medical record and care coordination; discussing PAP therapy, Study results and Further testing    RTC 2 weeks after testing. Patient agrees to return sooner if changes in symptoms.          This document has been electronically signed by AMBER Mcmullen on April 19, 2023 15:07 CDT            CC: Celso Blanco MD          No ref. provider found

## 2023-04-26 ENCOUNTER — HOSPITAL ENCOUNTER (OUTPATIENT)
Dept: SLEEP MEDICINE | Facility: HOSPITAL | Age: 68
Discharge: HOME | End: 2023-04-26
Payer: MEDICARE

## 2023-04-26 VITALS — HEIGHT: 73 IN | BODY MASS INDEX: 41.75 KG/M2 | WEIGHT: 315 LBS

## 2023-04-26 DIAGNOSIS — G47.34 NOCTURNAL HYPOXIA: ICD-10-CM

## 2023-04-26 DIAGNOSIS — G47.33 OBSTRUCTIVE SLEEP APNEA: ICD-10-CM

## 2023-04-26 DIAGNOSIS — E66.01 MORBID OBESITY: ICD-10-CM

## 2023-04-26 PROCEDURE — 95811 POLYSOM 6/>YRS CPAP 4/> PARM: CPT

## 2023-05-10 ENCOUNTER — OFFICE VISIT (OUTPATIENT)
Dept: SLEEP MEDICINE | Facility: HOSPITAL | Age: 68
End: 2023-05-10
Payer: MEDICARE

## 2023-05-10 VITALS
DIASTOLIC BLOOD PRESSURE: 86 MMHG | WEIGHT: 315 LBS | BODY MASS INDEX: 41.75 KG/M2 | HEART RATE: 76 BPM | OXYGEN SATURATION: 94 % | SYSTOLIC BLOOD PRESSURE: 139 MMHG | HEIGHT: 73 IN

## 2023-05-10 DIAGNOSIS — G25.81 RESTLESS LEGS SYNDROME: ICD-10-CM

## 2023-05-10 DIAGNOSIS — G47.33 OBSTRUCTIVE SLEEP APNEA: Primary | ICD-10-CM

## 2023-05-10 DIAGNOSIS — E66.01 MORBID OBESITY: ICD-10-CM

## 2023-05-10 RX ORDER — ROPINIROLE 1 MG/1
TABLET, FILM COATED ORAL
Qty: 120 TABLET | Refills: 2 | Status: SHIPPED | OUTPATIENT
Start: 2023-05-10

## 2023-05-10 RX ORDER — MONTELUKAST SODIUM 10 MG/1
1 TABLET ORAL DAILY
COMMUNITY
Start: 2023-04-21

## 2023-05-10 RX ORDER — FERROUS SULFATE 325(65) MG
325 TABLET ORAL
Qty: 30 TABLET | Refills: 2 | Status: SHIPPED | OUTPATIENT
Start: 2023-05-10

## 2023-05-10 RX ORDER — MULTIVIT WITH MINERALS/LUTEIN
250 TABLET ORAL DAILY
Qty: 30 TABLET | Refills: 2 | Status: SHIPPED | OUTPATIENT
Start: 2023-05-10

## 2023-05-10 RX ORDER — ROPINIROLE 0.25 MG/1
TABLET, FILM COATED ORAL
Qty: 120 TABLET | Refills: 2 | Status: SHIPPED | OUTPATIENT
Start: 2023-05-10 | End: 2023-05-10 | Stop reason: DRUGHIGH

## 2023-05-10 RX ORDER — FLUTICASONE PROPIONATE 50 MCG
SPRAY, SUSPENSION (ML) NASAL
COMMUNITY
Start: 2023-04-14

## 2023-05-10 NOTE — PROGRESS NOTES
Sleep Clinic Follow Up - Sleep Study Results    Date: 5/10/2023  Primary Care Provider: Celso Blanco MD  Chief complaint/Reason for visit: follow up sleep testing    Last office visit: 04/19/2023    HPI:  The patient is a 68 y.o. male who underwent in lab PSG on 04/03/2023 and was found to have an AHI of 60 with 134 minutes of hypoxia. He then underwent in lab PAP titration.  I discussed the results of the recent PAP titration performed on 04/26/2023. He was initially trialed on CPAP pressures of 10-15 cm H2O with pressure intolerance. He was transitioned to BiPAP and titrated from 20/15-22/15 cm H2O, with most optimal control at 22/15 cm H2O with a modified full face mask.   The patient had a periodic limb movement index of 69.  The patient did not have any significant cardiac arrhythmias, pulse range of 57-81 bpm. O2 grayson of 75% with no sustained hypoxia, 33 minutes total sleep time with SpO2 </=88%.  Patient still had increased leg movements even after taking Requip 2 mg.      INTERVAL MEDICAL HISTORY: No change since last office visit in regard to the patient's bedtime routine, medications, or diagnosis.      Review of Systems:  Denies chest pain, shortness of breath, leg swelling, cough, fever, chills, abdominal pain, N/V/D.    MEDICATIONS:   Current Outpatient Medications:   •  albuterol sulfate  (90 Base) MCG/ACT inhaler, Inhale 2 puffs Every 4 (Four) Hours As Needed., Disp: , Rfl:   •  amLODIPine (NORVASC) 10 MG tablet, Take 1 tablet by mouth Daily., Disp: , Rfl:   •  aspirin 325 MG tablet, aspirin 325 mg tablet  Take 1 tablet every day by oral route., Disp: , Rfl:   •  famotidine (PEPCID) 40 MG tablet, , Disp: , Rfl:   •  finasteride (PROSCAR) 5 MG tablet, , Disp: , Rfl:   •  Gemtesa 75 MG tablet, Take 1 tablet by mouth Daily., Disp: , Rfl:   •  HumuLIN 70/30 KwikPen (70-30) 100 UNIT/ML suspension pen-injector, INJECT 70 UNITS SUBCUTANEOUSLY WITH BREAKFAST, 25 UNITS AT LUNCH, AND 50 UNITS  IN THE EVENING. ADJUST AS INSTRUCTED. MAX DAILY DOSE 200 UNITS, Disp: , Rfl:   •  lisinopril-hydrochlorothiazide (PRINZIDE,ZESTORETIC) 20-25 MG per tablet, , Disp: , Rfl:   •  loratadine (CLARITIN) 10 MG tablet, Take 1 tablet by mouth Every Morning., Disp: , Rfl:   •  meloxicam (MOBIC) 15 MG tablet, , Disp: , Rfl:   •  metFORMIN (GLUCOPHAGE) 1000 MG tablet, , Disp: , Rfl:   •  pantoprazole (PROTONIX) 40 MG EC tablet, , Disp: , Rfl:   •  Potassium (POTASSIMIN PO), Take  by mouth., Disp: , Rfl:   •  rOPINIRole (REQUIP) 0.25 MG tablet, Take 1 tablet by mouth Every Night. Take 1 hour before bedtime., Disp: , Rfl:   •  Tamsulosin HCl (FLOMAX PO), Take  by mouth., Disp: , Rfl:   •  Trulicity 0.75 MG/0.5ML solution pen-injector, INJECT 0.75 Milligram SUBCUTANEOUSLY ONCE A WEEK, Disp: , Rfl:     PHYSICAL EXAM:    Vitals:    05/10/23 1406   BP: 139/86   Pulse: 76   SpO2: 94%         05/10/23  1406   Weight: (!) 166 kg (366 lb)     Body mass index is 48.3 kg/m².  Class 3 Severe Obesity (BMI >=40). Obesity-related health conditions include the following: obstructive sleep apnea, hypertension, diabetes mellitus and dyslipidemias. Obesity is unchanged. BMI is is above average; BMI management plan is completed. I recommend portion control and increasing exercise.    Gen:                No distress, conversant, pleasant, appears stated age, alert, oriented  Eyes:               Anicteric sclera, moist conjunctiva, no lid lag                           PERRL, EOMI   Heent:             NC/AT                          Oropharynx clear                          Normal hearing  Lungs:             Normal effort, non-labored breathing        CV:                  Normal S1/S2                          No lower extremity edema  ABD:               Soft, rounded, non-distended                 Psych:             Appropriate affect  Neuro:             CN 2-12 appear intact      No results found for: WBC, RBC, HGB, HCT, MCV, MCH, MCHC, RDW, RDWSD,  MPV, PLT, NEUTRORELPCT, LYMPHORELPCT, MONORELPCT, EOSRELPCT, BASORELPCT, AUTOIGPER, NEUTROABS, LYMPHSABS, MONOSABS, EOSABS, BASOSABS, AUTOIGNUM, NRBC    No results found for: GLUCOSE, BUN, CREATININE, EGFRRESULT, EGFR, BCR, K, CO2, CALCIUM, PROTENTOTREF, ALBUMIN, BILITOT, AST, ALT      Assessment and Plan:    1. Obstructive sleep apnea - Established, not yet controlled  1. The sleep study results were discussed in detail with the patient. The risks of untreated sleep apnea were reviewed. Treatment options for sleep apnea were discussed. I counseled patient on sleep hygiene, including regular sleep wake schedule and stimulus control therapy, the importance of weight reduction, and abstaining from smoking and alcohol consumption  2. Script for BiPAP 22/15 cm H2O with modified full face mask (likes [employer]) ([facility]  3. Pertinent labs were reviewed as listed above  4. Follow up in 31-90 days with compliance report, or sooner if trouble with PAP adaptation  2. Restless leg syndrome/PLMD - Established, not controlled  1. Increase Requip to 3 mg nightly (already taking 1 mg in the AM as well) - script sent  2. Ferrous sulfate 325 mg daily and vitamin C 250 mg daily  3. Address at follow up  3. Morbid obesity - BMI 48.3 - stable chronic illness      All of the patient's questions were answered. He states understanding and agreement with my assessment and plan as above.     I spent 25 minutes caring for Bakari on this date of service. This time includes time spent by me in the following activities: preparing for the visit, reviewing tests, obtaining and/or reviewing a separately obtained history, performing a medically appropriate examination and/or evaluation , counseling and educating the patient/family/caregiver, ordering medications, tests, or procedures, documenting information in the medical record and care coordination; discussing PAP therapy, PAP compliance, PAP maintenance, Medication  changes and Study results    Patient to follow up in 31-90 days with compliance report. Patient agrees to return sooner if changes in symptoms.         This document has been electronically signed by AMBER Mcmullen on May 10, 2023 14:07 CDT            CC: Celso Blanco MD          No ref. provider found

## 2023-08-07 ENCOUNTER — OFFICE VISIT (OUTPATIENT)
Dept: SLEEP MEDICINE | Facility: CLINIC | Age: 68
End: 2023-08-07
Payer: MEDICARE

## 2023-08-07 VITALS
HEART RATE: 85 BPM | WEIGHT: 315 LBS | OXYGEN SATURATION: 95 % | DIASTOLIC BLOOD PRESSURE: 82 MMHG | SYSTOLIC BLOOD PRESSURE: 158 MMHG | BODY MASS INDEX: 41.75 KG/M2 | HEIGHT: 73 IN

## 2023-08-07 DIAGNOSIS — G47.33 OBSTRUCTIVE SLEEP APNEA: Primary | ICD-10-CM

## 2023-08-07 DIAGNOSIS — E66.01 MORBID OBESITY: ICD-10-CM

## 2023-08-07 DIAGNOSIS — G25.81 RESTLESS LEGS SYNDROME (RLS): ICD-10-CM

## 2023-08-07 PROCEDURE — 1160F RVW MEDS BY RX/DR IN RCRD: CPT | Performed by: NURSE PRACTITIONER

## 2023-08-07 PROCEDURE — 1159F MED LIST DOCD IN RCRD: CPT | Performed by: NURSE PRACTITIONER

## 2023-08-07 PROCEDURE — 99213 OFFICE O/P EST LOW 20 MIN: CPT | Performed by: NURSE PRACTITIONER

## 2023-08-07 RX ORDER — AZELASTINE 1 MG/ML
SPRAY, METERED NASAL
COMMUNITY
Start: 2023-05-04

## 2023-08-07 RX ORDER — FLUTICASONE FUROATE, UMECLIDINIUM BROMIDE AND VILANTEROL TRIFENATATE 200; 62.5; 25 UG/1; UG/1; UG/1
POWDER RESPIRATORY (INHALATION)
COMMUNITY

## 2023-08-07 NOTE — PROGRESS NOTES
Sleep Clinic Follow Up    Date: 8/7/2023  Primary Care Provider: Celso Blanco MD    Last office visit: 05/10/2023 (I reviewed this note)    CC: Follow up: TRAMAINE started on BiPAP      Interim History:  Since the last visit:    1) severe TRAMAINE -  Bakari Dover has remained compliant with BiPAP. He denies mask and machine issues, dry mouth, headaches, or pressures intolerance. He denies abnormal dreams, sleep paralysis, nasal congestion, URI sx. Since starting PAP therapy patient reports less daytime sleepiness and more refreshing sleep.     2) Patient denies RLS symptoms. He is taking Requip 1 mg daily plus 3 mg nightly, as well as ferrous sulfate 325 mg and Vitamin C 250 mg daily.     Sleep Testing:    PSG on 04/03/2023, AHI of 60, hypoxia   PAP titration on 04/26/2023, recommended 22/15 cm H2O   Currently on 22/15 cm H2O    PAP Data:    Time frame: 05/31/2023-08/07/2023   Compliance: 97%  Average use on days used: 7 hrs 32 min  Percent of days with usage greater than or equal to 4 hours: 96%  PAP range: 22/15 cm H2O  Average 90% pressure: 22/15 cmH2O  Leak: 39.6 L/minute  Average AHI: 0.8 events/hr  Mask type: Full face mask  DME: [facility]  Machine type: ResMed AirCurve 10 VAuto    Bed time: 2200  Sleep latency: 1-5 minutes  Number of times awakens during the night: 0-1  Wake time: 8970-9947  Estimated total sleep time at night: 6-7 hours  Caffeine intake: 2 cups of coffee, 0 cups of tea, and 0 sodas per day  Alcohol intake: 0 drinks per week  Nap time: most days if unstimulated, short naps   Sleepiness with Driving: none     [place] - 11 (previously 20)  [place] Sleepiness Scale  Sitting and reading: Moderate chance of dozing  Watching TV: Moderate chance of dozing  Sitting, inactive in a public place (e.g. a theatre or a meeting): Moderate chance of dozing  As a passenger in a car for an hour without a break: Moderate chance of dozing  Lying down to rest in the afternoon when circumstances  permit: Moderate chance of dozing  Sitting and talking to someone: Slight chance of dozing  Sitting quietly after a lunch without alcohol: Would never doze  In a car, while stopped for a few minutes in traffic: Would never doze  Total score: 11    PMHx, FH, SH reviewed and pertinent changes are: Has upcoming appointment with Pulmonary. Started on [employer].       Review of Systems:   Negative for chest pain, SOA, fever, chills, cough, N/V/D, abdominal pain.    Smoking:none    Bakari Dover  reports that he has quit smoking. He has never used smokeless tobacco.    Physical Exam:  Vitals:    08/07/23 1123   BP: 158/82   Pulse: 85   SpO2: 95%           08/07/23  1123   Weight: (!) 166 kg (366 lb)     Body mass index is 48.3 kg/mý.   Class 3 Severe Obesity (BMI >=40). Obesity-related health conditions include the following: obstructive sleep apnea, hypertension, diabetes mellitus and dyslipidemias. Obesity is unchanged. BMI is is above average; BMI management plan is completed. I recommend portion control and increasing exercise.     Gen:                No distress, conversant, pleasant, appears stated age, alert, oriented  Eyes:               Anicteric sclera, moist conjunctiva, no lid lag                           PERRL, EOMI   Heent:             NC/AT                          Oropharynx clear                          Normal hearing  Lungs:             Normal effort, non-labored breathing         CV:                  Normal S1/S2                          No lower extremity edema  ABD:               Soft, rounded, non-distended                    Psych:             Appropriate affect  Neuro:             CN 2-12 appear intact    Past Medical History:   Diagnosis Date    Diabetes mellitus     Hypertension     Kidney stone     Renal disorder        Current Outpatient Medications:     albuterol sulfate  (90 Base) MCG/ACT inhaler, Inhale 2 puffs Every 4 (Four) Hours As Needed., Disp: , Rfl:     amLODIPine (NORVASC)  10 MG tablet, Take 1 tablet by mouth Daily., Disp: , Rfl:     aspirin 325 MG tablet, aspirin 325 mg tablet  Take 1 tablet every day by oral route., Disp: , Rfl:     azelastine (ASTELIN) 0.1 % nasal spray, USE ONE APRAY IN EACH NOSTRIL TWICE DAILY AS DIRECTED, Disp: , Rfl:     famotidine (PEPCID) 40 MG tablet, , Disp: , Rfl:     ferrous sulfate 325 (65 FE) MG tablet, Take 1 tablet by mouth Daily With Breakfast. Take with vitamin C to help with absorption, Disp: 30 tablet, Rfl: 2    finasteride (PROSCAR) 5 MG tablet, , Disp: , Rfl:     fluticasone (FLONASE) 50 MCG/ACT nasal spray, SPRAY TWO SPRAYS IN EACH NOSTRIL EVERY MORNING, Disp: , Rfl:     Gemtesa 75 MG tablet, Take 1 tablet by mouth Daily., Disp: , Rfl:     HumuLIN 70/30 KwikPen (70-30) 100 UNIT/ML suspension pen-injector, INJECT 70 UNITS SUBCUTANEOUSLY WITH BREAKFAST, 25 UNITS AT LUNCH, AND 50 UNITS IN THE EVENING. ADJUST AS INSTRUCTED. MAX DAILY DOSE 200 UNITS, Disp: , Rfl:     hydrocortisone 2.5 % cream, APPLY TO THE AFFECTED AREA(S) ON FACE TWICE DAILY FOR ONE WEEK THEN AS NEEDED, Disp: , Rfl:     lisinopril-hydrochlorothiazide (PRINZIDE,ZESTORETIC) 20-25 MG per tablet, , Disp: , Rfl:     loratadine (CLARITIN) 10 MG tablet, Take 1 tablet by mouth Every Morning., Disp: , Rfl:     meloxicam (MOBIC) 15 MG tablet, , Disp: , Rfl:     metFORMIN (GLUCOPHAGE) 1000 MG tablet, , Disp: , Rfl:     montelukast (SINGULAIR) 10 MG tablet, Take 1 tablet by mouth Daily., Disp: , Rfl:     pantoprazole (PROTONIX) 40 MG EC tablet, , Disp: , Rfl:     Potassium (POTASSIMIN PO), Take  by mouth., Disp: , Rfl:     rOPINIRole (REQUIP) 1 MG tablet, Take 1 hour before bedtime. Take 1 tablet in the AM, take 3 tablets nightly., Disp: 120 tablet, Rfl: 2    Tamsulosin HCl (FLOMAX PO), Take  by mouth., Disp: , Rfl:     Trulicity 0.75 MG/0.5ML solution pen-injector, INJECT 0.75 Milligram SUBCUTANEOUSLY ONCE A WEEK, Disp: , Rfl:     vitamin C (ASCORBIC ACID) 250 MG tablet, Take 1 tablet by  mouth Daily., Disp: 30 tablet, Rfl: 2    Trelegy Ellipta 200-62.5-25 MCG/ACT aerosol powder , inhale one puff by mouth every day, Disp: , Rfl:     No results found for: WBC, RBC, HGB, HCT, MCV, MCH, MCHC, RDW, RDWSD, MPV, PLT, NEUTRORELPCT, LYMPHORELPCT, MONORELPCT, EOSRELPCT, BASORELPCT, AUTOIGPER, NEUTROABS, LYMPHSABS, MONOSABS, EOSABS, BASOSABS, AUTOIGNUM, NRBC    No results found for: GLUCOSE, BUN, CREATININE, EGFRRESULT, EGFR, BCR, K, CO2, CALCIUM, PROTENTOTREF, ALBUMIN, BILITOT, AST, ALT    Assessment and Plan:    Obstructive sleep apnea - Established, stable (1)  Compliant with PAP therapy  Continue PAP as prescribed  Script for PAP supplies  Pertinent labs were reviewed as listed above  Return to clinic in 1 year with compliance report unless change in symptoms in interim period  Restless leg syndrome/Periodic limb movement disorder (RLS/PLMD) - Established, stable (1)  Continue Requip 1 mg daily and 3 mg QHS as needed  Morbid obesity - BMI 48.3 - stable chronic illness      I spent 20 minutes caring for Bakari on this date of service. This time includes time spent by me in the following activities: preparing for the visit, reviewing tests, obtaining and/or reviewing a separately obtained history, performing a medically appropriate examination and/or evaluation , counseling and educating the patient/family/caregiver, documenting information in the medical record, and care coordination; discussing PAP therapy, PAP compliance, and PAP maintenance    RTC in 12 months. Patient agrees to return sooner if changes in symptoms.          This document has been electronically signed by AMBER Mcmullen on August 7, 2023 11:30 CDT            CC: Celso Blanco MD          No ref. provider found

## 2023-09-28 RX ORDER — DM/PE/ACETAMINOPHEN/CHLORPHENR 10-5-325-2
TABLET, SEQUENTIAL ORAL
Qty: 30 TABLET | Refills: 5 | Status: SHIPPED | OUTPATIENT
Start: 2023-09-28

## 2023-09-28 RX ORDER — FERROUS SULFATE 325(65) MG
TABLET ORAL
Qty: 30 TABLET | Refills: 5 | Status: SHIPPED | OUTPATIENT
Start: 2023-09-28

## 2025-07-08 ENCOUNTER — OUTSIDE FACILITY SERVICE (OUTPATIENT)
Dept: PULMONOLOGY | Facility: CLINIC | Age: 70
End: 2025-07-08
Payer: MEDICARE

## 2025-07-09 ENCOUNTER — OUTSIDE FACILITY SERVICE (OUTPATIENT)
Dept: PULMONOLOGY | Facility: CLINIC | Age: 70
End: 2025-07-09
Payer: MEDICARE

## 2025-07-10 ENCOUNTER — OUTSIDE FACILITY SERVICE (OUTPATIENT)
Dept: PULMONOLOGY | Facility: CLINIC | Age: 70
End: 2025-07-10
Payer: MEDICARE

## 2025-07-11 ENCOUNTER — OUTSIDE FACILITY SERVICE (OUTPATIENT)
Dept: PULMONOLOGY | Facility: CLINIC | Age: 70
End: 2025-07-11
Payer: MEDICARE